# Patient Record
Sex: FEMALE | Race: WHITE | NOT HISPANIC OR LATINO | Employment: FULL TIME | URBAN - NONMETROPOLITAN AREA
[De-identification: names, ages, dates, MRNs, and addresses within clinical notes are randomized per-mention and may not be internally consistent; named-entity substitution may affect disease eponyms.]

---

## 2017-03-08 ENCOUNTER — TRANSCRIBE ORDERS (OUTPATIENT)
Dept: ADMINISTRATIVE | Facility: HOSPITAL | Age: 47
End: 2017-03-08

## 2017-03-08 DIAGNOSIS — I34.1 MITRAL VALVE PROLAPSE: Primary | ICD-10-CM

## 2017-03-15 ENCOUNTER — HOSPITAL ENCOUNTER (OUTPATIENT)
Dept: CARDIOLOGY | Facility: HOSPITAL | Age: 47
Discharge: HOME | End: 2017-03-15
Admitting: FAMILY MEDICINE

## 2017-03-15 ENCOUNTER — OUTSIDE FACILITY SERVICE (OUTPATIENT)
Dept: CARDIOLOGY | Facility: CLINIC | Age: 47
End: 2017-03-15

## 2017-03-15 DIAGNOSIS — I34.1 MITRAL VALVE PROLAPSE: ICD-10-CM

## 2017-03-15 PROCEDURE — 93306 TTE W/DOPPLER COMPLETE: CPT

## 2017-03-15 PROCEDURE — 93306 TTE W/DOPPLER COMPLETE: CPT | Performed by: INTERNAL MEDICINE

## 2017-04-10 ENCOUNTER — APPOINTMENT (OUTPATIENT)
Dept: WOMENS IMAGING | Facility: HOSPITAL | Age: 47
End: 2017-04-10

## 2017-04-10 PROCEDURE — 77063 BREAST TOMOSYNTHESIS BI: CPT | Performed by: RADIOLOGY

## 2017-04-10 PROCEDURE — 77067 SCR MAMMO BI INCL CAD: CPT | Performed by: RADIOLOGY

## 2018-05-30 ENCOUNTER — APPOINTMENT (OUTPATIENT)
Dept: WOMENS IMAGING | Facility: HOSPITAL | Age: 48
End: 2018-05-30

## 2018-05-30 PROCEDURE — 77063 BREAST TOMOSYNTHESIS BI: CPT | Performed by: RADIOLOGY

## 2018-05-30 PROCEDURE — 77067 SCR MAMMO BI INCL CAD: CPT | Performed by: RADIOLOGY

## 2018-06-07 ENCOUNTER — APPOINTMENT (OUTPATIENT)
Dept: WOMENS IMAGING | Facility: HOSPITAL | Age: 48
End: 2018-06-07

## 2018-06-07 PROCEDURE — 77063 BREAST TOMOSYNTHESIS BI: CPT | Performed by: RADIOLOGY

## 2018-06-07 PROCEDURE — 77065 DX MAMMO INCL CAD UNI: CPT | Performed by: RADIOLOGY

## 2018-11-01 ENCOUNTER — OFFICE VISIT (OUTPATIENT)
Dept: CARDIOLOGY | Facility: CLINIC | Age: 48
End: 2018-11-01

## 2018-11-01 VITALS
WEIGHT: 236 LBS | HEIGHT: 67 IN | DIASTOLIC BLOOD PRESSURE: 88 MMHG | BODY MASS INDEX: 37.04 KG/M2 | HEART RATE: 72 BPM | SYSTOLIC BLOOD PRESSURE: 130 MMHG

## 2018-11-01 DIAGNOSIS — E88.81 METABOLIC SYNDROME: ICD-10-CM

## 2018-11-01 DIAGNOSIS — R00.2 PALPITATIONS: Primary | ICD-10-CM

## 2018-11-01 DIAGNOSIS — R01.1 MURMUR, CARDIAC: ICD-10-CM

## 2018-11-01 DIAGNOSIS — R06.02 SHORTNESS OF BREATH: ICD-10-CM

## 2018-11-01 DIAGNOSIS — I34.0 MITRAL VALVE INSUFFICIENCY, UNSPECIFIED ETIOLOGY: ICD-10-CM

## 2018-11-01 DIAGNOSIS — R07.2 PRECORDIAL PAIN: ICD-10-CM

## 2018-11-01 PROBLEM — E88.810 METABOLIC SYNDROME: Status: ACTIVE | Noted: 2018-11-01

## 2018-11-01 PROCEDURE — 93000 ELECTROCARDIOGRAM COMPLETE: CPT | Performed by: INTERNAL MEDICINE

## 2018-11-01 PROCEDURE — 99244 OFF/OP CNSLTJ NEW/EST MOD 40: CPT | Performed by: INTERNAL MEDICINE

## 2018-11-01 RX ORDER — DICLOFENAC SODIUM 75 MG/1
75 TABLET, DELAYED RELEASE ORAL 2 TIMES DAILY
COMMUNITY

## 2018-11-01 RX ORDER — TRAZODONE HYDROCHLORIDE 50 MG/1
50 TABLET ORAL NIGHTLY
COMMUNITY

## 2018-11-01 NOTE — PROGRESS NOTES
"Chief Complaint   Patient presents with   • Establish Care     Patient wishes to establish care. She has had echo 3/2017, echo in chart. She states \"I have been told I have mitral valve prolapse and I would like to discuss echo results\".    • Aspirin     Patient does not take.   • Shortness of Breath     She had during episode of palpitations yesterday, states \"had soreness in chest during episode of palpitations\".   • Palpitations     Had episode yesterday that last 45 minutes, she became weak the rest of day. She had headache. She reports having random episodes.        CARDIAC COMPLAINTS  chest pressure/discomfort, dyspnea and palpitations      Subjective   Tasha Muñoz is a 48 y.o. female came in today for her initial cardiac evaluation.  She has no previously diagnosed cardiac problems in the form of hypertension or diabetes.  She was told that she did have mitral valve prolapse many years ago.  About a year ago, she underwent an echocardiogram which showed normal LV function and mild to moderate mitral regurgitation.  She was not having any symptoms about a year ago.  Recently, she started noticing palpitation in the form of heart suddenly raises and stays like that for about 30-45 minutes.  She had an episode yesterday which apparently lasted for about 45 minutes.  By the time she came to your office, the symptoms subsided.  She apparently had an EKG which according to her was normal.  During the time when she had the palpitation she had shortness of breath and soreness in the chest during the palpitation.  She normally gets his palpitation about 2 or 3 times a month and normally lasts only for few minutes.  She did have some lab work done at the beginning of the year which showed normal thyroid function and normal electrolytes.  Her cholesterol was not checked at that time.  She is not a smoker and there is no significant family history of sudden death or ischemic heart disease.  She does work in the " kitchen at the school.      Past Surgical History:   Procedure Laterality Date   • ECHO - CONVERTED  03/15/2017    EF 65%. Mild- Mod MR. RVSP- 35 mmHg.       Current Outpatient Prescriptions   Medication Sig Dispense Refill   • diclofenac (VOLTAREN) 75 MG EC tablet Take 75 mg by mouth 2 (Two) Times a Day.     • sertraline (ZOLOFT) 50 MG tablet Take 50 mg by mouth Daily.     • traZODone (DESYREL) 50 MG tablet Take 50 mg by mouth Every Night.       No current facility-administered medications for this visit.            ALLERGIES:  Patient has no known allergies.    Past Medical History:   Diagnosis Date   • Arthritis    • Hx of  section    • Hx of foot surgery 2018   • Hx of total knee replacement, right    • Mitral valve prolapse        History   Smoking Status   • Never Smoker   Smokeless Tobacco   • Never Used          Family History   Problem Relation Age of Onset   • Lung cancer Mother    • No Known Problems Father    • No Known Problems Brother    • No Known Problems Son    • No Known Problems Daughter        Review of Systems   Constitution: Positive for weakness and malaise/fatigue. Negative for decreased appetite.   HENT: Negative for congestion and sore throat.    Eyes: Negative for blurred vision.   Cardiovascular: Positive for chest pain, dyspnea on exertion and palpitations.   Respiratory: Positive for shortness of breath. Negative for snoring.    Endocrine: Negative for cold intolerance and heat intolerance.   Hematologic/Lymphatic: Negative for adenopathy. Does not bruise/bleed easily.   Skin: Negative for itching, nail changes and skin cancer.   Musculoskeletal: Negative for arthritis and myalgias.   Gastrointestinal: Negative for abdominal pain, dysphagia and heartburn.   Genitourinary: Negative for bladder incontinence and frequency.   Neurological: Negative for dizziness, light-headedness, seizures and vertigo.   Psychiatric/Behavioral: Negative for altered mental status. The patient  "is nervous/anxious.    Allergic/Immunologic: Negative for environmental allergies and hives.       Diabetes- No  Thyroid- normal    Objective     /88 (BP Location: Right arm)   Pulse 72   Ht 170.2 cm (67\")   Wt 107 kg (236 lb)   BMI 36.96 kg/m²     Physical Exam   Constitutional: She is oriented to person, place, and time. She appears well-developed and well-nourished.   HENT:   Head: Normocephalic.   Nose: Nose normal.   Eyes: Pupils are equal, round, and reactive to light. EOM are normal.   Neck: Normal range of motion. Neck supple.   Cardiovascular: Normal rate, regular rhythm, S1 normal and S2 normal.    Murmur heard.  Pulmonary/Chest: Breath sounds normal.   Abdominal: Soft. Bowel sounds are normal.   Musculoskeletal: Normal range of motion. She exhibits no edema.   Neurological: She is alert and oriented to person, place, and time.   Skin: Skin is warm and dry.   Psychiatric: She has a normal mood and affect.         ECG 12 Lead  Date/Time: 11/1/2018 1:53 PM  Performed by: ONELIA BROCK  Authorized by: ONELIA BROCK   Previous ECG: no previous ECG available  Rhythm: sinus rhythm  Rate: normal  Conduction: non-specific intraventricular conduction delay  Clinical impression: non-specific ECG              Assessment/Plan   Patient's Body mass index is 36.96 kg/m². BMI is above normal parameters. Recommendations include: educational material, exercise counseling and nutrition counseling.     Tasha was seen today for establish care, aspirin, shortness of breath and palpitations.    Diagnoses and all orders for this visit:    Palpitations  -     Mobile Cardiac Outpatient Telemetry; Future  -     Treadmill Stress Test; Future    Shortness of breath  -     Adult Transthoracic Echo Complete W/ Cont if Necessary Per Protocol; Future    Murmur, cardiac  -     Adult Transthoracic Echo Complete W/ Cont if Necessary Per Protocol; Future    Mitral valve insufficiency, unspecified " etiology    Metabolic syndrome    Precordial pain  -     Treadmill Stress Test; Future       At baseline, her heart rate is stable but the blood pressure is upper limit of normal.  Her BMI is 37.  Her EKG showed sinus rhythm with intraventricular conduction delay.  Her clinical examination is unremarkable other than short systolic murmur at the mitral area.  Her rest of the examination was normal.  I explained to her about the echo findings.  I explained to her about the mitral regurgitation. I talked to her about cutting down on the sodium intake.  I also talked to her about cutting down on the carbohydrate intake.  I gave her papers on Mediterranean diet.  I would place an extended monitor to see what can of arrhythmia she has.  I'm going to repeat the echocardiogram to reevaluate the mitral valve and the PA pressure.  Also scheduled her to undergo a regular stress test to evaluate for any stress-induced arrhythmia and ischemia since she also has some chest tightness during the palpitation.  Based on the results of these tests, further recommendations will be made.             Electronically signed by Carmela Melara MD November 1, 2018 1:40 PM

## 2018-11-01 NOTE — PATIENT INSTRUCTIONS
Mediterranean Diet  A Mediterranean diet refers to food and lifestyle choices that are based on the traditions of countries located on the [place]. This way of eating has been shown to help prevent certain conditions and improve outcomes for people who have chronic diseases, like kidney disease and heart disease.  What are tips for following this plan?  Lifestyle  · Cook and eat meals together with your family, when possible.  · Drink enough fluid to keep your urine clear or pale yellow.  · Be physically active every day. This includes:  ? Aerobic exercise like running or swimming.  ? Leisure activities like gardening, walking, or housework.  · Get 7-8 hours of sleep each night.  · If recommended by your health care provider, drink red wine in moderation. This means 1 glass a day for nonpregnant women and 2 glasses a day for men. A glass of wine equals 5 oz (150 mL).  Reading food labels  · Check the serving size of packaged foods. For foods such as rice and pasta, the serving size refers to the amount of cooked product, not dry.  · Check the total fat in packaged foods. Avoid foods that have saturated fat or trans fats.  · Check the ingredients list for added sugars, such as corn syrup.  Shopping  · At the grocery store, buy most of your food from the areas near the walls of the store. This includes:  ? Fresh fruits and vegetables (produce).  ? Grains, beans, nuts, and seeds. Some of these may be available in unpackaged forms or large amounts (in bulk).  ? Fresh seafood.  ? Poultry and eggs.  ? Low-fat dairy products.  · Buy whole ingredients instead of prepackaged foods.  · Buy fresh fruits and vegetables in-season from local farmers markets.  · Buy frozen fruits and vegetables in resealable bags.  · If you do not have access to quality fresh seafood, buy precooked frozen shrimp or canned fish, such as tuna, salmon, or sardines.  · Buy small amounts of raw or cooked vegetables, salads, or olives from the  deli or salad bar at your store.  · Stock your pantry so you always have certain foods on hand, such as olive oil, canned tuna, canned tomatoes, rice, pasta, and beans.  Cooking  · Cook foods with extra-virgin olive oil instead of using butter or other vegetable oils.  · Have meat as a side dish, and have vegetables or grains as your main dish. This means having meat in small portions or adding small amounts of meat to foods like pasta or stew.  · Use beans or vegetables instead of meat in common dishes like chili or lasagna.  · [place] with different cooking methods. Try roasting or broiling vegetables instead of steaming or sautéeing them.  · Add frozen vegetables to soups, stews, pasta, or rice.  · Add nuts or seeds for added healthy fat at each meal. You can add these to yogurt, salads, or vegetable dishes.  · Marinate fish or vegetables using olive oil, lemon juice, garlic, and fresh herbs.  Meal planning  · Plan to eat 1 vegetarian meal one day each week. Try to work up to 2 vegetarian meals, if possible.  · Eat seafood 2 or more times a week.  · Have healthy snacks readily available, such as:  ? Vegetable sticks with hummus.  ? Greek yogurt.  ? Fruit and nut trail mix.  · Eat balanced meals throughout the week. This includes:  ? Fruit: 2-3 servings a day  ? Vegetables: 4-5 servings a day  ? Low-fat dairy: 2 servings a day  ? Fish, poultry, or lean meat: 1 serving a day  ? Beans and legumes: 2 or more servings a week  ? Nuts and seeds: 1-2 servings a day  ? Whole grains: 6-8 servings a day  ? Extra-virgin olive oil: 3-4 servings a day  · Limit red meat and sweets to only a few servings a month  What are my food choices?  · Mediterranean diet  ? Recommended  ? Grains: Whole-grain pasta. Brown rice. Bulgar wheat. Polenta. Couscous. Whole-wheat bread. Oatmeal. Quinoa.  ? Vegetables: Artichokes. Beets. Broccoli. Cabbage. Carrots. Eggplant. Green beans. Chard. Kale. Spinach. Onions. Leeks. Peas. Squash.  Tomatoes. Peppers. Radishes.  ? Fruits: Apples. Apricots. Avocado. Berries. Bananas. Cherries. Dates. Figs. Grapes. Rober. Melon. Oranges. Peaches. Plums. Pomegranate.  ? Meats and other protein foods: Beans. Almonds. Sunflower seeds. Pine nuts. Peanuts. Cod. [place]. Scallops. Shrimp. Tuna. Tilapia. Clams. Oysters. Eggs.  ? Dairy: Low-fat milk. Cheese. Greek yogurt.  ? Beverages: Water. Red wine. Herbal tea.  ? Fats and oils: Extra virgin olive oil. Avocado oil. Grape seed oil.  ? Sweets and desserts: Greek yogurt with honey. Baked apples. Poached pears. Trail mix.  ? Seasoning and other foods: Basil. Cilantro. Coriander. Cumin. Mint. Parsley. Anand. Rosemary. Tarragon. Garlic. Oregano. Thyme. Pepper. Balsalmic vinegar. Tahini. Hummus. Tomato sauce. Olives. Mushrooms.  ? Limit these  ? Grains: Prepackaged pasta or rice dishes. Prepackaged cereal with added sugar.  ? Vegetables: Deep fried potatoes (french fries).  ? Fruits: Fruit canned in syrup.  ? Meats and other protein foods: Beef. Pork. Lamb. Poultry with skin. Hot dogs. [place].  ? Dairy: Ice cream. Sour cream. Whole milk.  ? Beverages: Juice. Sugar-sweetened soft drinks. Beer. Liquor and spirits.  ? Fats and oils: Butter. Canola oil. Vegetable oil. Beef fat (tallow). Lard.  ? Sweets and desserts: Cookies. Cakes. Pies. Candy.  ? Seasoning and other foods: Mayonnaise. Premade sauces and marinades.  ? The items listed may not be a complete list. Talk with your dietitian about what dietary choices are right for you.  Summary  · The Mediterranean diet includes both food and lifestyle choices.  · Eat a variety of fresh fruits and vegetables, beans, nuts, seeds, and whole grains.  · Limit the amount of red meat and sweets that you eat.  · Talk with your health care provider about whether it is safe for you to drink red wine in moderation. This means 1 glass a day for nonpregnant women and 2 glasses a day for men. A glass of wine equals 5 oz (150 mL).  This information  is not intended to replace advice given to you by your health care provider. Make sure you discuss any questions you have with your health care provider.  Document Released: 08/10/2017 Document Revised: 09/12/2017 Document Reviewed: 08/10/2017  [employer] Interactive Patient Education © 2018 [employer]

## 2018-11-05 ENCOUNTER — TELEPHONE (OUTPATIENT)
Dept: CARDIOLOGY | Facility: CLINIC | Age: 48
End: 2018-11-05

## 2018-11-05 NOTE — TELEPHONE ENCOUNTER
Patient does not meet criteria with her insurance for an MCOT.  Do you want to order a Cardiokey?    If so how many days?

## 2018-11-06 ENCOUNTER — HOSPITAL ENCOUNTER (OUTPATIENT)
Dept: CARDIOLOGY | Facility: HOSPITAL | Age: 48
Discharge: HOME | End: 2018-11-06
Attending: INTERNAL MEDICINE | Admitting: INTERNAL MEDICINE

## 2018-11-06 ENCOUNTER — HOSPITAL ENCOUNTER (OUTPATIENT)
Dept: CARDIOLOGY | Facility: HOSPITAL | Age: 48
Discharge: HOME | End: 2018-11-06
Attending: INTERNAL MEDICINE

## 2018-11-06 VITALS — BODY MASS INDEX: 37.02 KG/M2 | HEIGHT: 67 IN | WEIGHT: 235.89 LBS

## 2018-11-06 DIAGNOSIS — R01.1 MURMUR, CARDIAC: ICD-10-CM

## 2018-11-06 DIAGNOSIS — R00.2 PALPITATIONS: ICD-10-CM

## 2018-11-06 DIAGNOSIS — R00.2 PALPITATION: Primary | ICD-10-CM

## 2018-11-06 DIAGNOSIS — R07.2 PRECORDIAL PAIN: ICD-10-CM

## 2018-11-06 DIAGNOSIS — R06.02 SHORTNESS OF BREATH: ICD-10-CM

## 2018-11-06 PROCEDURE — 93306 TTE W/DOPPLER COMPLETE: CPT | Performed by: INTERNAL MEDICINE

## 2018-11-06 PROCEDURE — 93306 TTE W/DOPPLER COMPLETE: CPT

## 2018-11-06 PROCEDURE — 93018 CV STRESS TEST I&R ONLY: CPT | Performed by: INTERNAL MEDICINE

## 2018-11-06 PROCEDURE — 93017 CV STRESS TEST TRACING ONLY: CPT

## 2018-11-07 ENCOUNTER — TELEPHONE (OUTPATIENT)
Dept: CARDIOLOGY | Facility: CLINIC | Age: 48
End: 2018-11-07

## 2018-11-07 ENCOUNTER — CLINICAL SUPPORT (OUTPATIENT)
Dept: CARDIOLOGY | Facility: CLINIC | Age: 48
End: 2018-11-07

## 2018-11-07 DIAGNOSIS — R00.2 PALPITATIONS: ICD-10-CM

## 2018-11-07 LAB
BH CV ECHO MEAS - AO MAX PG: 9 MMHG
BH CV ECHO MEAS - AO MEAN PG: 4.2 MMHG
BH CV ECHO MEAS - AO ROOT AREA (BSA CORRECTED): 1.2
BH CV ECHO MEAS - AO ROOT AREA: 5.4 CM^2
BH CV ECHO MEAS - AO ROOT DIAM: 2.6 CM
BH CV ECHO MEAS - AO V2 MAX: 150.4 CM/SEC
BH CV ECHO MEAS - AO V2 MEAN: 91.5 CM/SEC
BH CV ECHO MEAS - AO V2 VTI: 30.4 CM
BH CV ECHO MEAS - BSA(HAYCOCK): 2.3 M^2
BH CV ECHO MEAS - BSA: 2.2 M^2
BH CV ECHO MEAS - BZI_BMI: 37 KILOGRAMS/M^2
BH CV ECHO MEAS - BZI_METRIC_HEIGHT: 170.2 CM
BH CV ECHO MEAS - BZI_METRIC_WEIGHT: 107.1 KG
BH CV ECHO MEAS - EDV(CUBED): 74.5 ML
BH CV ECHO MEAS - EDV(TEICH): 78.9 ML
BH CV ECHO MEAS - EF(CUBED): 75.1 %
BH CV ECHO MEAS - EF(TEICH): 67.4 %
BH CV ECHO MEAS - ESV(CUBED): 18.6 ML
BH CV ECHO MEAS - ESV(TEICH): 25.8 ML
BH CV ECHO MEAS - FS: 37.1 %
BH CV ECHO MEAS - IVS/LVPW: 1.2
BH CV ECHO MEAS - IVSD: 1.1 CM
BH CV ECHO MEAS - LA DIMENSION: 3.2 CM
BH CV ECHO MEAS - LA/AO: 1.2
BH CV ECHO MEAS - LV IVRT: 0.07 SEC
BH CV ECHO MEAS - LV MASS(C)D: 141.2 GRAMS
BH CV ECHO MEAS - LV MASS(C)DI: 65.1 GRAMS/M^2
BH CV ECHO MEAS - LVIDD: 4.2 CM
BH CV ECHO MEAS - LVIDS: 2.6 CM
BH CV ECHO MEAS - LVPWD: 0.92 CM
BH CV ECHO MEAS - MITRAL HR: 143.3 BPM
BH CV ECHO MEAS - MITRAL R-R: 0.42 SEC
BH CV ECHO MEAS - MR MAX PG: 18.2 MMHG
BH CV ECHO MEAS - MR MAX VEL: 213.5 CM/SEC
BH CV ECHO MEAS - MV A MAX VEL: 79 CM/SEC
BH CV ECHO MEAS - MV DEC SLOPE: 313 CM/SEC^2
BH CV ECHO MEAS - MV DEC TIME: 0.26 SEC
BH CV ECHO MEAS - MV E MAX VEL: 80.3 CM/SEC
BH CV ECHO MEAS - MV E/A: 1
BH CV ECHO MEAS - MV MAX PG: 4.6 MMHG
BH CV ECHO MEAS - MV MEAN PG: 2.5 MMHG
BH CV ECHO MEAS - MV V2 MAX: 107.4 CM/SEC
BH CV ECHO MEAS - MV V2 MEAN: 75.1 CM/SEC
BH CV ECHO MEAS - MV V2 VTI: 31.4 CM
BH CV ECHO MEAS - PA MAX PG: 4.7 MMHG
BH CV ECHO MEAS - PA MEAN PG: 2.5 MMHG
BH CV ECHO MEAS - PA V2 MAX: 108.5 CM/SEC
BH CV ECHO MEAS - PA V2 MEAN: 73.2 CM/SEC
BH CV ECHO MEAS - PA V2 VTI: 23.3 CM
BH CV ECHO MEAS - PULM. HR: 188.5 BPM
BH CV ECHO MEAS - PULM. R-R: 0.32 SEC
BH CV ECHO MEAS - RAP SYSTOLE: 10 MMHG
BH CV ECHO MEAS - RVDD: 3.2 CM
BH CV ECHO MEAS - RVSP: 21.5 MMHG
BH CV ECHO MEAS - SI(AO): 75.9 ML/M^2
BH CV ECHO MEAS - SI(CUBED): 25.8 ML/M^2
BH CV ECHO MEAS - SI(TEICH): 24.5 ML/M^2
BH CV ECHO MEAS - SV(AO): 164.7 ML
BH CV ECHO MEAS - SV(CUBED): 55.9 ML
BH CV ECHO MEAS - SV(TEICH): 53.1 ML
BH CV ECHO MEAS - TR MAX VEL: 169.2 CM/SEC
BH CV STRESS RECOVERY BP: NORMAL MMHG
BH CV STRESS RECOVERY HR: 91 BPM
MAXIMAL PREDICTED HEART RATE: 172 BPM
MAXIMAL PREDICTED HEART RATE: 172 BPM
PERCENT MAX PREDICTED HR: 86.05 %
STRESS BASELINE BP: NORMAL MMHG
STRESS BASELINE HR: 78 BPM
STRESS PERCENT HR: 101 %
STRESS POST ESTIMATED WORKLOAD: 7 METS
STRESS POST EXERCISE DUR MIN: 5 MIN
STRESS POST EXERCISE DUR SEC: 1 SEC
STRESS POST PEAK BP: NORMAL MMHG
STRESS POST PEAK HR: 148 BPM
STRESS TARGET HR: 146 BPM
STRESS TARGET HR: 146 BPM

## 2018-11-07 PROCEDURE — 0296T PR EXT ECG > 48HR TO 21 DAY RCRD W/CONECT INTL RCRD: CPT | Performed by: INTERNAL MEDICINE

## 2018-11-07 NOTE — TELEPHONE ENCOUNTER
See result note on stress test.    30 day lopressor script sent to [facility] and 90 day lopressor script sent to [facility].

## 2018-11-26 ENCOUNTER — OUTSIDE FACILITY SERVICE (OUTPATIENT)
Dept: CARDIOLOGY | Facility: CLINIC | Age: 48
End: 2018-11-26

## 2018-11-26 PROCEDURE — 0298T PR EXT ECG > 48HR TO 21 DAY REVIEW AND INTERPRETATN: CPT | Performed by: INTERNAL MEDICINE

## 2019-02-07 ENCOUNTER — OFFICE VISIT (OUTPATIENT)
Dept: CARDIOLOGY | Facility: CLINIC | Age: 49
End: 2019-02-07

## 2019-02-07 VITALS
BODY MASS INDEX: 35.79 KG/M2 | HEART RATE: 56 BPM | WEIGHT: 228 LBS | HEIGHT: 67 IN | DIASTOLIC BLOOD PRESSURE: 62 MMHG | SYSTOLIC BLOOD PRESSURE: 90 MMHG

## 2019-02-07 DIAGNOSIS — E66.9 OBESITY (BMI 35.0-39.9 WITHOUT COMORBIDITY): ICD-10-CM

## 2019-02-07 DIAGNOSIS — I31.39 PERICARDIAL EFFUSION: ICD-10-CM

## 2019-02-07 DIAGNOSIS — Z79.899 MEDICATION MANAGEMENT: ICD-10-CM

## 2019-02-07 DIAGNOSIS — E88.81 METABOLIC SYNDROME: ICD-10-CM

## 2019-02-07 DIAGNOSIS — R01.1 MURMUR, CARDIAC: ICD-10-CM

## 2019-02-07 DIAGNOSIS — I34.0 MITRAL VALVE INSUFFICIENCY, UNSPECIFIED ETIOLOGY: Primary | ICD-10-CM

## 2019-02-07 DIAGNOSIS — R00.2 PALPITATIONS: ICD-10-CM

## 2019-02-07 PROCEDURE — 99213 OFFICE O/P EST LOW 20 MIN: CPT | Performed by: NURSE PRACTITIONER

## 2019-02-07 NOTE — PROGRESS NOTES
Chief Complaint   Patient presents with   • Follow-up     cardiac management.  metoprolol added at stress test.  Also wore 14 day monitor, no arrhythmia, metoprolol continued.  Patient had lap band on 12/28/18 with Dr. Ornelas, pt has lost 17 lbs since surgery.  Palpitations are much better.  BP today 90/62.  Pt does not feel dizziness.  She feels tired but not anything new.  She has noticed arthritis symptoms increased after starting metoprolol.  She said it is not bad though.   • Labs     2/2018 labs from PCP in chart.  Patient also had some labs with Dr. Ornelas in Dec 2018       Subjective       Tasha Muñoz is a 48 y.o. female  seen in November 2018 for initial cardiac evaluation.  She has no previously diagnosed cardiac problems except told that she did have mitral valve prolapse many years ago. In 2017, she underwent an echocardiogram which showed normal LV function and mild to moderate mitral regurgitation. She was referred due to palpitation in. Lab work showed normal thyroid function and normal electrolytes. ON 11/1/18, a regular stress test and echo were done. No ischemia or arrhythmia noted by EKG criteria. Heart rate response was normal but blood pressure increased. Low dose beta blocker in form of Lopressor recommended. IN December 2018, she underwent placement of Lap band by Dr. Ornelas.     Today she comes to the office for a follow up visit. She has lost about 18 pounds since December. Her blood pressure and heart rate are well controlled. Palpitations have significant improved. No increase in fatigue or shortness of breath noted.     HPI     Cardiac History:    Past Surgical History:   Procedure Laterality Date   • CARDIOVASCULAR STRESS TEST  11/06/2018    5 Min. 7.0 METS. 86% THR. BP- 179/76. Rare PVC. Negative.   • CONVERTED (HISTORICAL) HOLTER  11/26/2018    AVG HR 71 BPM.    • ECHO - CONVERTED  03/15/2017    EF 65%. Mild- Mod MR. RVSP- 35 mmHg.   • ECHO - CONVERTED  11/06/2018    EF  65%. Mild MR. RVSP- 22 mmHg. Small pericardial Effusion       Current Outpatient Medications   Medication Sig Dispense Refill   • diclofenac (VOLTAREN) 75 MG EC tablet Take 75 mg by mouth 2 (Two) Times a Day.     • metoprolol tartrate (LOPRESSOR) 25 MG tablet Decrease to 1/2 tablet twice a day 180 tablet 3   • sertraline (ZOLOFT) 50 MG tablet Take 50 mg by mouth Daily.     • traZODone (DESYREL) 50 MG tablet Take 50 mg by mouth Every Night.       No current facility-administered medications for this visit.        Patient has no known allergies.    Past Medical History:   Diagnosis Date   • Arthritis    • Hx of  section    • Hx of foot surgery 2018   • Hx of laparoscopic gastric banding 2018    Dr. Ornelas   • Hx of total knee replacement, right    • Mitral valve prolapse        Social History     Socioeconomic History   • Marital status:      Spouse name: Not on file   • Number of children: Not on file   • Years of education: Not on file   • Highest education level: Not on file   Social Needs   • Financial resource strain: Not on file   • Food insecurity - worry: Not on file   • Food insecurity - inability: Not on file   • Transportation needs - medical: Not on file   • Transportation needs - non-medical: Not on file   Occupational History   • Not on file   Tobacco Use   • Smoking status: Never Smoker   • Smokeless tobacco: Never Used   Substance and Sexual Activity   • Alcohol use: No   • Drug use: No   • Sexual activity: Not on file   Other Topics Concern   • Not on file   Social History Narrative   • Not on file       Family History   Problem Relation Age of Onset   • Lung cancer Mother    • No Known Problems Father    • No Known Problems Brother    • No Known Problems Son    • No Known Problems Daughter        Review of Systems   Constitution: Positive for malaise/fatigue (mild, no worse) and weight loss. Negative for weakness.   HENT: Negative for congestion, hoarse voice and  "odynophagia.    Eyes: Negative for visual disturbance.   Cardiovascular: Positive for palpitations (rare, much improved). Negative for chest pain, dyspnea on exertion, irregular heartbeat, leg swelling and near-syncope.   Respiratory: Negative for cough and shortness of breath.    Endocrine: Negative for polydipsia, polyphagia and polyuria.   Hematologic/Lymphatic: Negative for bleeding problem. Does not bruise/bleed easily.   Skin: Negative for color change, dry skin and itching.   Musculoskeletal: Positive for arthritis and joint pain. Negative for muscle cramps and muscle weakness.   Gastrointestinal: Negative for bloating, abdominal pain, heartburn and melena.   Genitourinary: Negative for dysuria and hematuria.   Neurological: Negative for dizziness, headaches and light-headedness.   Psychiatric/Behavioral: The patient does not have insomnia and is not nervous/anxious.         Objective     BP 90/62 (BP Location: Left arm)   Pulse 56   Ht 170.2 cm (67\")   Wt 103 kg (228 lb)   BMI 35.71 kg/m²     Physical Exam   Constitutional: She is oriented to person, place, and time. She appears well-nourished.   HENT:   Head: Normocephalic.   Eyes: Conjunctivae are normal. Pupils are equal, round, and reactive to light.   Neck: Normal range of motion. Neck supple. Carotid bruit is not present.   Cardiovascular: Normal rate, regular rhythm, S1 normal, S2 normal and normal pulses.   No murmur heard.  Pulmonary/Chest: Effort normal and breath sounds normal. She has no wheezes. She has no rales.   Abdominal: Soft. Bowel sounds are normal.   Musculoskeletal: Normal range of motion. She exhibits no edema.   Neurological: She is alert and oriented to person, place, and time.   Skin: Skin is warm and dry. No pallor.   Psychiatric: She has a normal mood and affect.        Procedures: none today        Assessment/Plan      Tasha was seen today for follow-up and labs.    Diagnoses and all orders for this visit:    Mitral valve " insufficiency, unspecified etiology    Obesity (BMI 35.0-39.9 without comorbidity)    Murmur, cardiac    Palpitations    Metabolic syndrome    Pericardial effusion    Medication management    Other orders  -     metoprolol tartrate (LOPRESSOR) 25 MG tablet; Decrease to 1/2 tablet twice a day      The reports of her recent stress and echo reviewed. No ischemia was noted by EKG criteria. LV function normal and MR noted to be mild. Her blood pressure today is slightly low and heart rate well controlled. Advised to decrease Metoprolol to 1/2 tablet bid. Monitor vital signs. We discussed, as she loses weight, the need for medication will diminish and beta blocker maybe stopped.   Small pericardial effusion noted. No cardiac rub noted today. Repeat echo not advised at his time, unless she develops more symptoms.    Patient's Body mass index is 35.71 kg/m². BMI is above normal parameters. Recommendations include: nutrition counseling. Diet per Dr. Ornelas. I complimented her weight loss.      Low impact cardio exercise, such as 150 minutes of walking weekly based on guidelines, was encouraged.     From a cardiac standpoint, Tasha appears stable. We will see her back in 6 months. Please call sooner for any cardiac concerns.            Electronically signed by CAITLYN Chamberlain,  February 8, 2019 8:29 AM

## 2019-02-07 NOTE — PATIENT INSTRUCTIONS
Palpitations  A palpitation is the feeling that your heartbeat is irregular or is faster than normal. It may feel like your heart is fluttering or skipping a beat. Palpitations are usually not a serious problem. They may be caused by many things, including smoking, caffeine, alcohol, stress, and certain medicines. Although most causes of palpitations are not serious, palpitations can be a sign of a serious medical problem. In some cases, you may need further medical evaluation.  Follow these instructions at home:  Pay attention to any changes in your symptoms. Take these actions to help with your condition:  · Avoid the following:  ? Caffeinated coffee, tea, soft drinks, diet pills, and energy drinks.  ? Chocolate.  ? Alcohol.  · Do not use any tobacco products, such as cigarettes, chewing tobacco, and e-cigarettes. If you need help quitting, ask your health care provider.  · Try to reduce your stress and anxiety. Things that can help you relax include:  ? Yoga.  ? Meditation.  ? Physical activity, such as swimming, jogging, or walking.  ? Biofeedback. This is a method that helps you learn to use your mind to control things in your body, such as your heartbeats.  · Get plenty of rest and sleep.  · Take over-the-counter and prescription medicines only as told by your health care provider.  · Keep all follow-up visits as told by your health care provider. This is important.    Contact a health care provider if:  · You continue to have a fast or irregular heartbeat after 24 hours.  · Your palpitations occur more often.  Get help right away if:  · You have chest pain or shortness of breath.  · You have a severe headache.  · You feel dizzy or you faint.  This information is not intended to replace advice given to you by your health care provider. Make sure you discuss any questions you have with your health care provider.  Document Released: 12/15/2001 Document Revised: 05/22/2017 Document Reviewed: 09/01/2016  [employer]  Interactive Patient Education © 2018 [employer] Inc.

## 2019-06-10 ENCOUNTER — APPOINTMENT (OUTPATIENT)
Dept: WOMENS IMAGING | Facility: HOSPITAL | Age: 49
End: 2019-06-10

## 2019-06-10 PROCEDURE — 77067 SCR MAMMO BI INCL CAD: CPT | Performed by: RADIOLOGY

## 2019-06-10 PROCEDURE — 77063 BREAST TOMOSYNTHESIS BI: CPT | Performed by: RADIOLOGY

## 2020-01-02 ENCOUNTER — TELEPHONE (OUTPATIENT)
Dept: CARDIOLOGY | Facility: CLINIC | Age: 50
End: 2020-01-02

## 2020-01-27 ENCOUNTER — OFFICE VISIT (OUTPATIENT)
Dept: CARDIOLOGY | Facility: CLINIC | Age: 50
End: 2020-01-27

## 2020-01-27 VITALS
WEIGHT: 225 LBS | SYSTOLIC BLOOD PRESSURE: 124 MMHG | HEART RATE: 72 BPM | BODY MASS INDEX: 35.31 KG/M2 | HEIGHT: 67 IN | DIASTOLIC BLOOD PRESSURE: 70 MMHG

## 2020-01-27 DIAGNOSIS — I34.0 MITRAL VALVE INSUFFICIENCY, UNSPECIFIED ETIOLOGY: ICD-10-CM

## 2020-01-27 DIAGNOSIS — R00.2 PALPITATIONS: ICD-10-CM

## 2020-01-27 DIAGNOSIS — Z79.899 MEDICATION MANAGEMENT: ICD-10-CM

## 2020-01-27 DIAGNOSIS — E88.81 METABOLIC SYNDROME: ICD-10-CM

## 2020-01-27 DIAGNOSIS — E66.9 OBESITY (BMI 30-39.9): ICD-10-CM

## 2020-01-27 PROCEDURE — 99213 OFFICE O/P EST LOW 20 MIN: CPT | Performed by: NURSE PRACTITIONER

## 2020-01-27 RX ORDER — AMITRIPTYLINE HYDROCHLORIDE 10 MG/1
10 TABLET, FILM COATED ORAL NIGHTLY
COMMUNITY
End: 2021-01-28 | Stop reason: ALTCHOICE

## 2020-01-27 NOTE — PROGRESS NOTES
Chief Complaint   Patient presents with   • Follow-up     Cardiac management . no current labs . Has no cardiac complaints today. Is  wearing Combi patch hormone patch   • Med Refill     Needs refills on Metoprolol   [facility] supply [facility]         Subjective       Tasha Muñoz is a 49 y.o. female  seen in November 2018 for initial cardiac evaluation.  She has no previously diagnosed cardiac problems except told that she did have mitral valve prolapse many years ago. In 2017, she underwent an echocardiogram which showed normal LV function and mild to moderate mitral regurgitation. She was referred due to palpitation in. Lab work showed normal thyroid function and normal electrolytes. ON 11/1/18, a regular stress test and echo were done. No ischemia or arrhythmia noted by EKG criteria. Heart rate response was normal but blood pressure increased. Low dose beta blocker in form of Lopressor recommended. IN December 2018, she underwent placement of Lap band by Dr. Ornelas.  At her February 2019 office visit her weight was down about 20 pounds, blood pressure was well controlled and palpitations had improved.  The dose of metoprolol was decreased to half tablet twice a day.    Today she comes to the office for a follow-up visit.  Overall she feels she is doing very well.  Most days she takes 1 tablet of metoprolol at night and only half in the mornings.  Sometimes if she is feeling more stressed or had more caffeine she will take a whole tablet in the mornings with benefit.  No chest pain, dizziness, or inappropriate shortness of breath noted.  She is currently being treated for menopausal symptoms with hormone patch and finding some benefit.    HPI     Cardiac History:    Past Surgical History:   Procedure Laterality Date   • CARDIOVASCULAR STRESS TEST  11/06/2018    5 Min. 7.0 METS. 86% THR. BP- 179/76. Rare PVC. Negative.   • CONVERTED (HISTORICAL) HOLTER  11/26/2018    AVG HR 71 BPM.    • ECHO -  CONVERTED  03/15/2017    EF 65%. Mild- Mod MR. RVSP- 35 mmHg.   • ECHO - CONVERTED  2018    EF 65%. Mild MR. RVSP- 22 mmHg. Small pericardial Effusion       Current Outpatient Medications   Medication Sig Dispense Refill   • amitriptyline (ELAVIL) 10 MG tablet Take 10 mg by mouth Every Night.     • diclofenac (VOLTAREN) 75 MG EC tablet Take 75 mg by mouth 2 (Two) Times a Day.     • metoprolol tartrate (LOPRESSOR) 25 MG tablet TAKE 1 TABLET TWICE A DAY 90 tablet 3   • traZODone (DESYREL) 50 MG tablet Take 50 mg by mouth Every Night.       No current facility-administered medications for this visit.        Patient has no known allergies.    Past Medical History:   Diagnosis Date   • Arthritis    • Hx of  section    • Hx of foot surgery 2018   • Hx of laparoscopic gastric banding 2018    Dr. Ornelas   • Hx of total knee replacement, right    • Mitral valve prolapse        Social History     Socioeconomic History   • Marital status:      Spouse name: Not on file   • Number of children: Not on file   • Years of education: Not on file   • Highest education level: Not on file   Tobacco Use   • Smoking status: Never Smoker   • Smokeless tobacco: Never Used   Substance and Sexual Activity   • Alcohol use: No   • Drug use: No       Family History   Problem Relation Age of Onset   • Lung cancer Mother    • No Known Problems Father    • No Known Problems Brother    • No Known Problems Son    • No Known Problems Daughter        Review of Systems   Constitution: Positive for malaise/fatigue (improved with menopause tx) and night sweats. Negative for decreased appetite.   HENT: Negative for congestion, hoarse voice and nosebleeds.    Eyes: Negative.    Cardiovascular: Negative for chest pain, leg swelling, near-syncope and palpitations.   Respiratory: Negative.    Endocrine: Negative.    Hematologic/Lymphatic: Negative.    Skin: Negative.    Musculoskeletal: Negative for muscle cramps and muscle  "weakness.   Gastrointestinal: Negative for bloating, abdominal pain, heartburn and melena.        Follows with Dr. King   Genitourinary: Negative for dysuria and hematuria.   Neurological: Negative for dizziness and headaches.   Psychiatric/Behavioral: The patient is nervous/anxious. The patient does not have insomnia.    Allergic/Immunologic: Negative.         Objective     /70 (BP Location: Left arm)   Pulse 72   Ht 170.2 cm (67\")   Wt 102 kg (225 lb)   BMI 35.24 kg/m²     Physical Exam   Constitutional: She is oriented to person, place, and time. She appears well-nourished.   HENT:   Head: Normocephalic.   Eyes: Pupils are equal, round, and reactive to light. Conjunctivae are normal.   Neck: Normal range of motion. Neck supple. Carotid bruit is not present.   Cardiovascular: Normal rate, regular rhythm, S1 normal and S2 normal.   No murmur heard.  Pulses:       Radial pulses are 2+ on the right side, and 2+ on the left side.   Pulmonary/Chest: Breath sounds normal. She has no wheezes. She has no rales.   Abdominal: Soft. Bowel sounds are normal. There is no tenderness.   Musculoskeletal: Normal range of motion. She exhibits no edema.   Neurological: She is alert and oriented to person, place, and time.   Skin: Skin is warm and dry.   Psychiatric: She has a normal mood and affect. Her behavior is normal.        Procedures: none today         Assessment/Plan      Tasha was seen today for follow-up and med refill.    Diagnoses and all orders for this visit:    Metabolic syndrome    Mitral valve insufficiency, unspecified etiology    Palpitations    Medication management    Obesity (BMI 30-39.9)    Other orders  -     metoprolol tartrate (LOPRESSOR) 25 MG tablet; TAKE 1 TABLET TWICE A DAY    Tasha presents today without cardiac concerns or symptoms voiced.  Her blood pressure, heart rate, and heart rhythm today are normal.  Refills given to continue Lopressor 25 mg twice a day.  If palpitations " worsen or other concerns develop she understands to call.    MR- clinically cardiac murmur sounds stable.  No repeat cardiac testing warranted at this time.    She will follow with you and GYN for lab orders/management.  Please forward copy of most recent lab results.    Patient's Body mass index is 35.24 kg/m². BMI is above normal parameters. Recommendations include: nutrition counseling.  Weight stable since last office visit.  She does follow at weight loss clinic post lap banding.  I also encouraged her on routine exercise.     Tasha Muñoz  reports that she has never smoked. She has never used smokeless tobacco..       A 1 year follow-up visit scheduled.  Please call sooner for cardiac concerns.           Electronically signed by CAITLYN Chamberlain,  January 27, 2020 4:32 PM

## 2020-07-15 ENCOUNTER — APPOINTMENT (OUTPATIENT)
Dept: WOMENS IMAGING | Facility: HOSPITAL | Age: 50
End: 2020-07-15

## 2020-07-15 PROCEDURE — 77067 SCR MAMMO BI INCL CAD: CPT | Performed by: RADIOLOGY

## 2020-07-15 PROCEDURE — 77063 BREAST TOMOSYNTHESIS BI: CPT | Performed by: RADIOLOGY

## 2021-01-28 ENCOUNTER — OFFICE VISIT (OUTPATIENT)
Dept: CARDIOLOGY | Facility: CLINIC | Age: 51
End: 2021-01-28

## 2021-01-28 VITALS
BODY MASS INDEX: 37.98 KG/M2 | TEMPERATURE: 98 F | SYSTOLIC BLOOD PRESSURE: 124 MMHG | HEIGHT: 67 IN | HEART RATE: 72 BPM | DIASTOLIC BLOOD PRESSURE: 86 MMHG | WEIGHT: 242 LBS

## 2021-01-28 DIAGNOSIS — D69.1 THROMBOCYTE DISORDER (HCC): ICD-10-CM

## 2021-01-28 DIAGNOSIS — E88.81 METABOLIC SYNDROME: Primary | ICD-10-CM

## 2021-01-28 DIAGNOSIS — Z79.899 MEDICATION MANAGEMENT: ICD-10-CM

## 2021-01-28 DIAGNOSIS — R00.2 PALPITATIONS: ICD-10-CM

## 2021-01-28 DIAGNOSIS — R73.9 HYPERGLYCEMIA: ICD-10-CM

## 2021-01-28 DIAGNOSIS — E78.5 HYPERLIPIDEMIA LDL GOAL <100: ICD-10-CM

## 2021-01-28 DIAGNOSIS — I34.0 MITRAL VALVE INSUFFICIENCY, UNSPECIFIED ETIOLOGY: ICD-10-CM

## 2021-01-28 DIAGNOSIS — E66.9 OBESITY (BMI 35.0-39.9 WITHOUT COMORBIDITY): ICD-10-CM

## 2021-01-28 DIAGNOSIS — Z01.818 PREOPERATIVE CLEARANCE: ICD-10-CM

## 2021-01-28 PROCEDURE — 99213 OFFICE O/P EST LOW 20 MIN: CPT | Performed by: NURSE PRACTITIONER

## 2021-01-28 RX ORDER — NORTRIPTYLINE HYDROCHLORIDE 25 MG/1
25 CAPSULE ORAL NIGHTLY
COMMUNITY

## 2021-01-28 RX ORDER — VENLAFAXINE 75 MG/1
75 TABLET ORAL DAILY
COMMUNITY

## 2021-01-28 NOTE — PROGRESS NOTES
Chief Complaint   Patient presents with   • Follow-up     for cardiac management   • Med Refill     refills needed on metoprolol, [facility]   • Palpitations     only had a couple episodes with heart racing and fluttering. Pt only taking metoprolol at night.    • Labs     has had several labs by PCP and her rheumatologist.    • Procedure     planning to have total knee replacement March the 3rd with DR Morales. Advised pt to call for clearance.     Subjective       Tasha Muñoz is a 50 y.o. female seen in November 2018 for initial cardiac evaluation of palpitations.  She has no previously diagnosed cardiac problems other than MVP diagnosed many years ago.  In 2017, echocardiogram showed normal LV function and mild to moderate mitral regurgitation.  Labs showed normal thyroid and electrolytes.  Holter showed no significant arrhythmia.  On 11/1/18, a regular stress test and echo were done. No ischemia or arrhythmia noted by EKG criteria. Heart rate response was normal but blood pressure increased. Low dose beta blocker, Lopressor recommended. Holter showed no significant arrhythmia. In December 2018, she underwent placement of Lap band and lost modest amount of weight.      She returns today for yearly follow-up. She has intermittent palpitations where she feels her heart will race and pound but notes significant improvement with metoprolol. She is taking one tablet before bed. Most of the time, palpitations occur early to mid day. She had one episode felt to be triggered by bending over, then her heart raced for 3 hours. No chest pain, dizziness, near syncope. Labs with Dr. Perales showed lipids elevated with , HDL 44. Pravastatin 10 mg added, so far tolerating. She continues to follow with rheumatology. Recent CBC showed H/H 10.9/34.1, platelets 480. CMP normal. She is planning to have R TKR with Dr. Morales on 3/3/21. She requests cardiac clearance letter sent, needs EKG and labs.           Cardiac History:    Past Surgical History:   Procedure Laterality Date   • CARDIOVASCULAR STRESS TEST  2018    5 Min. 7.0 METS. 86% THR. BP- 179/76. Rare PVC. Negative.   • CONVERTED (HISTORICAL) HOLTER  2018    AVG HR 71 BPM.    • ECHO - CONVERTED  03/15/2017    EF 65%. Mild- Mod MR. RVSP- 35 mmHg.   • ECHO - CONVERTED  2018    EF 65%. Mild MR. RVSP- 22 mmHg. Small pericardial Effusion     Current Outpatient Medications   Medication Sig Dispense Refill   • diclofenac (VOLTAREN) 75 MG EC tablet Take 75 mg by mouth 2 (Two) Times a Day.     • metoprolol tartrate (LOPRESSOR) 25 MG tablet Take 1/2 tablet twice daily 90 tablet 4   • nortriptyline (PAMELOR) 25 MG capsule Take 25 mg by mouth Every Night.     • traZODone (DESYREL) 50 MG tablet Take 50 mg by mouth Every Night.     • venlafaxine (EFFEXOR) 75 MG tablet Take 75 mg by mouth Daily.       No current facility-administered medications for this visit.      Patient has no known allergies.    Past Medical History:   Diagnosis Date   • Arthritis    • Hx of  section    • Hx of foot surgery 2018   • Hx of laparoscopic gastric banding 2018    Dr. Ornelas   • Hx of total knee replacement, right    • Mitral valve prolapse      Social History     Socioeconomic History   • Marital status:      Spouse name: Not on file   • Number of children: Not on file   • Years of education: Not on file   • Highest education level: Not on file   Tobacco Use   • Smoking status: Never Smoker   • Smokeless tobacco: Never Used   Substance and Sexual Activity   • Alcohol use: No   • Drug use: No     Family History   Problem Relation Age of Onset   • Lung cancer Mother    • No Known Problems Father    • No Known Problems Brother    • No Known Problems Son    • No Known Problems Daughter      Review of Systems   Constitution: Negative for decreased appetite and malaise/fatigue.   HENT: Negative.    Eyes: Negative for blurred vision.  "  Cardiovascular: Positive for palpitations. Negative for chest pain, dyspnea on exertion, leg swelling and syncope.   Respiratory: Negative for shortness of breath and sleep disturbances due to breathing.    Endocrine: Negative.    Hematologic/Lymphatic: Negative for bleeding problem. Does not bruise/bleed easily.   Skin: Negative.    Musculoskeletal: Negative for falls and myalgias.   Gastrointestinal: Negative for abdominal pain, heartburn and melena.   Genitourinary: Negative for hematuria.   Neurological: Negative for dizziness and light-headedness.   Psychiatric/Behavioral: Negative for altered mental status.   Allergic/Immunologic: Negative.       Objective     /86   Pulse 72   Temp 98 °F (36.7 °C)   Ht 170.2 cm (67\")   Wt 110 kg (242 lb)   BMI 37.90 kg/m²     Vitals signs and nursing note reviewed.   Constitutional:       General: Not in acute distress.     Appearance: Well-developed. Not diaphoretic.   Eyes:      Pupils: Pupils are equal, round, and reactive to light.   HENT:      Head: Normocephalic.   Neck:      Musculoskeletal: Normal range of motion.   Pulmonary:      Effort: Pulmonary effort is normal. No respiratory distress.      Breath sounds: Normal breath sounds.   Cardiovascular:      Normal rate. Regular rhythm.      Murmurs: There is a grade 1/6 systolic murmur at the apex.   Pulses:     Intact distal pulses.   Edema:     Peripheral edema absent.   Abdominal:      General: Bowel sounds are normal.      Palpations: Abdomen is soft.   Musculoskeletal: Normal range of motion.   Skin:     General: Skin is warm and dry.   Neurological:      Mental Status: Alert and oriented to person, place, and time.          ECG 12 Lead    Date/Time: 1/29/2021 9:41 AM  Performed by: Kym Ley APRN  Authorized by: Kym Ley APRN   Comparison: compared with previous ECG from 11/1/2018  Similar to previous ECG  Rhythm: sinus rhythm  Rate: normal  BPM: 75  ST Segments: ST segments normal    Clinical " impression: normal ECG                Problem List Items Addressed This Visit        Other    Palpitations    Relevant Orders    CBC & Differential    TSH    Magnesium    Metabolic syndrome - Primary    Relevant Orders    CBC & Differential    Mitral valve insufficiency    Relevant Medications    metoprolol tartrate (LOPRESSOR) 25 MG tablet    Other Relevant Orders    CBC & Differential      Other Visit Diagnoses     Medication management        Relevant Orders    CBC & Differential    Protime-INR    aPTT    Obesity (BMI 35.0-39.9 without comorbidity)        Relevant Orders    CBC & Differential    Preoperative clearance        Relevant Orders    CBC & Differential    Hyperglycemia        Relevant Orders    CBC & Differential    Hemoglobin A1c    Comprehensive Metabolic Panel    Thrombocyte disorder (CMS/HCC)        Relevant Orders    aPTT    Hyperlipidemia LDL goal <100        Relevant Orders    Lipid Panel         1. Mitral Regurgitation- mild to moderate by echocardiogram in 2017 and 2018. She has slight 1/6 systolic murmur at the mitral area, not clinically significant. Will continue to monitor.     2. Palpitations- sinus tachycardia documented, manage with low dose beta blocker. Recommend dividing Lopressor, take 1/2 in am 1/2 in pm for better 24 hour coverage. Limit caffeine. Adequate water intake. If she develops persistent heart racing as described in HPI, she may need to have an EKG at onset of symptoms or reapply holter. Will check TSH, mag.     3. Hyperlipidemia- pravastatin 10 mg QHS initiated by PCP for /HDL 44. Agree with low dose, moderate intensity statin for prevention of CAD.  Will recheck lipids/LFT to evaluate efficacy.     4. OA- R TKR scheduled for 3/3/21 with Dr. Nava. Will discuss with Dr. Melara and send clearance.     EKG done today for preop showed NSR, no ST-T changes. Labs ordered for preop including CBC, CMP, F. Lipids, A1C, PT/INR, PTT, pre-albumin, TSH and magnesium.  Will forward to Dr. Perales as well as Dr. Morales when available.     She appears stable from a cardiac standpoint. We discussed heart healthy diet/Mediterranean diet/exercise/weight loss strategies.  Follow up in one year or sooner as needed.     Patient's Body mass index is 37.9 kg/m². BMI is above normal parameters. Recommendations include: nutrition counseling.               Electronically signed by CAITLYN Maria,  January 29, 2021 09:46 EST

## 2021-01-29 ENCOUNTER — TELEPHONE (OUTPATIENT)
Dept: CARDIOLOGY | Facility: CLINIC | Age: 51
End: 2021-01-29

## 2021-01-29 DIAGNOSIS — Z01.818 PREOPERATIVE CLEARANCE: Primary | ICD-10-CM

## 2021-01-29 PROCEDURE — 93000 ELECTROCARDIOGRAM COMPLETE: CPT | Performed by: NURSE PRACTITIONER

## 2021-02-09 NOTE — TELEPHONE ENCOUNTER
Annie,     Can we send cardiac clearance letter to Dr. Ashton as well as EKG that was done at recent fu visit? She is having labs at [facility] that we can forward when she has them done. Sx is on 3/3/21.     Thank you

## 2021-08-06 ENCOUNTER — APPOINTMENT (OUTPATIENT)
Dept: WOMENS IMAGING | Facility: HOSPITAL | Age: 51
End: 2021-08-06

## 2021-08-06 PROCEDURE — 77067 SCR MAMMO BI INCL CAD: CPT | Performed by: RADIOLOGY

## 2021-08-06 PROCEDURE — 77063 BREAST TOMOSYNTHESIS BI: CPT | Performed by: RADIOLOGY

## 2022-01-18 ENCOUNTER — OFFICE VISIT (OUTPATIENT)
Dept: CARDIOLOGY | Facility: CLINIC | Age: 52
End: 2022-01-18

## 2022-01-18 VITALS
HEART RATE: 80 BPM | SYSTOLIC BLOOD PRESSURE: 100 MMHG | BODY MASS INDEX: 35.79 KG/M2 | DIASTOLIC BLOOD PRESSURE: 60 MMHG | HEIGHT: 67 IN | WEIGHT: 228 LBS | TEMPERATURE: 98.5 F

## 2022-01-18 DIAGNOSIS — R00.2 PALPITATIONS: ICD-10-CM

## 2022-01-18 DIAGNOSIS — R73.9 HYPERGLYCEMIA: ICD-10-CM

## 2022-01-18 DIAGNOSIS — E88.81 METABOLIC SYNDROME: Primary | ICD-10-CM

## 2022-01-18 DIAGNOSIS — E78.5 HYPERLIPIDEMIA LDL GOAL <100: ICD-10-CM

## 2022-01-18 DIAGNOSIS — E55.9 VITAMIN D DEFICIENCY: ICD-10-CM

## 2022-01-18 PROCEDURE — 99214 OFFICE O/P EST MOD 30 MIN: CPT | Performed by: NURSE PRACTITIONER

## 2022-01-18 RX ORDER — LORATADINE 10 MG/1
10 TABLET ORAL DAILY PRN
COMMUNITY

## 2022-01-18 RX ORDER — PRAVASTATIN SODIUM 10 MG
10 TABLET ORAL NIGHTLY
COMMUNITY
End: 2023-03-09

## 2022-01-18 NOTE — PROGRESS NOTES
Chief Complaint   Patient presents with   • Follow-up     Cardiac management   • Lab     Last labs in March per PCP.   • Rapid Heart Rate     Had episode end of December and first of this month with elevated heart. Heart felt like it was racing and became short of breath. She had been taking prednisone, Augmentin and Bromphen/pseudoephedrine.   • Palpitations     Had increased episodes while having bronchitis, still feels at times like heart is going to flutter. She increased Lopressor to 25mg  bid.   • Med Refill     Needs refills on Metoprolol-90 day.   • Weight loss     Has been doing weight watchers.     Subjective       Tasah Muñoz is a 51 y.o. female seen in November 2018 for initial cardiac evaluation of palpitations.  She has no previously diagnosed cardiac problems other than MVP diagnosed many years ago. In 2017, echo showed normal LVEF, mild to moderate mitral regurgitation. Electrolytes were normal. Holter showed no significant arrhythmia. On 11/1/18, a regular stress test and echo were done. No ischemia or arrhythmia noted by EKG criteria.  Lopressor started for mild hypertensive management palpitations.  December 2018, she underwent placement of Lap band and lost modest amount of weight.    Pravastatin 10 mg added for .  She underwent heart TKR with Dr. Nava on 3/3/2021 without complications.      She returns today for yearly follow-up.  In December 2021, she was treated for bronchitis with antibiotics, steroids, Sudafed/Bromfed.  After treatment, she noticed increased palpitations.  She increased her Lopressor to 25 mg twice daily with benefit.  Heart racing and palpitation significantly improved but continues to have occasional flutter/racing.  She continues to follow with rheumatology.  She has lost 14 pounds with weight watchers.       Cardiac History:    Past Surgical History:   Procedure Laterality Date   • CARDIOVASCULAR STRESS TEST  11/06/2018    5 Min. 7.0 METS. 86% THR.  BP- 179/76. Rare PVC. Negative.   • CONVERTED (HISTORICAL) HOLTER  2018    AVG HR 71 BPM.    • ECHO - CONVERTED  03/15/2017    EF 65%. Mild- Mod MR. RVSP- 35 mmHg.   • ECHO - CONVERTED  2018    EF 65%. Mild MR. RVSP- 22 mmHg. Small pericardial Effusion     Current Outpatient Medications   Medication Sig Dispense Refill   • Ascorbic Acid (VITAMIN C PO) Take 500 mg by mouth Daily.     • B Complex Vitamins (VITAMIN B COMPLEX PO) Take  by mouth Daily.     • diclofenac (VOLTAREN) 75 MG EC tablet Take 75 mg by mouth 2 (Two) Times a Day.     • Estradiol (CLIMARA TD) Place  on the skin as directed by provider 1 (One) Time Per Week.     • loratadine (CLARITIN) 10 MG tablet Take 10 mg by mouth Daily.     • metoprolol tartrate (LOPRESSOR) 25 MG tablet Take one tablet twice daily 180 tablet 3   • nortriptyline (PAMELOR) 25 MG capsule Take 25 mg by mouth Every Night.     • pravastatin (PRAVACHOL) 10 MG tablet Take 10 mg by mouth Every Night.     • traZODone (DESYREL) 50 MG tablet Take 50 mg by mouth Every Night.     • venlafaxine (EFFEXOR) 75 MG tablet Take 75 mg by mouth Daily.     • VITAMIN D, CHOLECALCIFEROL, PO Take  by mouth Daily.       No current facility-administered medications for this visit.     Patient has no known allergies.    Past Medical History:   Diagnosis Date   • Arthritis    • Hx of  section    • Hx of foot surgery 2018   • Hx of laparoscopic gastric banding 2018    Dr. Ornelas   • Hx of total knee replacement, right    • Mitral valve prolapse    • Osteoarthritis      Social History     Socioeconomic History   • Marital status:    Tobacco Use   • Smoking status: Never Smoker   • Smokeless tobacco: Never Used   Substance and Sexual Activity   • Alcohol use: No   • Drug use: No     Family History   Problem Relation Age of Onset   • Lung cancer Mother    • No Known Problems Father    • No Known Problems Brother    • No Known Problems Son    • No Known Problems  "Daughter      Review of Systems   Constitutional: Negative for decreased appetite and malaise/fatigue.   HENT: Negative.    Eyes: Negative for blurred vision.   Cardiovascular: Positive for palpitations. Negative for chest pain, dyspnea on exertion, leg swelling and syncope.   Respiratory: Negative for shortness of breath and sleep disturbances due to breathing.    Endocrine: Negative.    Hematologic/Lymphatic: Negative for bleeding problem. Does not bruise/bleed easily.   Skin: Negative.    Musculoskeletal: Negative for falls and myalgias.   Gastrointestinal: Negative for abdominal pain, heartburn and melena.   Genitourinary: Negative for hematuria.   Neurological: Negative for dizziness and light-headedness.   Psychiatric/Behavioral: Negative for altered mental status.   Allergic/Immunologic: Negative.       Objective     /60 (BP Location: Left arm)   Pulse 80   Temp 98.5 °F (36.9 °C)   Ht 170.2 cm (67.01\")   Wt 103 kg (228 lb)   BMI 35.70 kg/m²     Vitals and nursing note reviewed.   Constitutional:       General: Not in acute distress.     Appearance: Well-developed. Not diaphoretic.   Eyes:      Pupils: Pupils are equal, round, and reactive to light.   HENT:      Head: Normocephalic.   Pulmonary:      Effort: Pulmonary effort is normal. No respiratory distress.      Breath sounds: Normal breath sounds.   Cardiovascular:      Normal rate. Regular rhythm.   Pulses:     Intact distal pulses.   Edema:     Peripheral edema absent.   Abdominal:      General: Bowel sounds are normal.      Palpations: Abdomen is soft.   Musculoskeletal: Normal range of motion.      Cervical back: Normal range of motion. Skin:     General: Skin is warm and dry.   Neurological:      Mental Status: Alert and oriented to person, place, and time.        Procedures          Problem List Items Addressed This Visit        Cardiac and Vasculature    Palpitations    Relevant Orders    TSH (Completed)    Magnesium (Completed)    CBC " (No Diff) (Completed)    Comprehensive Metabolic Panel (Completed)    Vitamin D 25 Hydroxy       Endocrine and Metabolic    Metabolic syndrome - Primary    Relevant Orders    TSH (Completed)    Magnesium (Completed)    CBC (No Diff) (Completed)    Comprehensive Metabolic Panel (Completed)    Lipid Panel (Completed)    Vitamin D 25 Hydroxy      Other Visit Diagnoses     Hyperglycemia        Relevant Orders    TSH (Completed)    Magnesium (Completed)    CBC (No Diff) (Completed)    Comprehensive Metabolic Panel (Completed)    Vitamin D 25 Hydroxy    Hyperlipidemia LDL goal <100        Relevant Medications    pravastatin (PRAVACHOL) 10 MG tablet    Other Relevant Orders    TSH (Completed)    Magnesium (Completed)    CBC (No Diff) (Completed)    Comprehensive Metabolic Panel (Completed)    Lipid Panel (Completed)    Vitamin D 25 Hydroxy    Vitamin D deficiency        Relevant Orders    Vitamin D 25 Hydroxy         Palpitations- rhythm regular today without ectopic beats.  I explained to her the increase risk of PVCs/tachyarrhythia with steroids, Sudafed.  Continue Lopressor 25 mg twice daily.  Once palpitations improved, reduce evening dose to half tablet.  If symptoms persist, will repeat Holter monitor.  We will check TSH and magnesium level.  Limit caffeine.  Adequate fluid intake.    Hyperlipidemia managed with low-dose pravastatin.  She tolerates well.  We will repeat lipid panel to evaluate LDL reduction.    MVP/MR-clinically stable, echo in 2018 reviewed with her.  We will plan to repeat in the next 1 year or sooner if symptoms persist.    Check labs for CBC, CMP, TSH, magnesium, fasting lipid and vitamin D.  Copy will be forwarded when available.    Patient's Body mass index is 35.7 kg/m². indicating that she is obese (BMI >30).  She was congratulated on weight loss efforts.            Electronically signed by CAITLYN Maria,  January 19, 2022 21:00 EST

## 2022-01-18 NOTE — PATIENT INSTRUCTIONS
Premature Ventricular Contraction    A premature ventricular contraction (PVC) is a common kind of irregular heartbeat (arrhythmia). These contractions are extra heartbeats that start in the ventricles of the heart and occur too early in the normal sequence. During the PVC, the heart's normal electrical pathway is not used, so the beat is shorter and less effective. In most cases, these contractions come and go and do not require treatment.  What are the causes?  Common causes of the condition include:  · Smoking.  · Drinking alcohol.  · Certain medicines.  · Some illegal drugs.  · Stress.  · Caffeine.  Certain medical conditions can also cause PVCs:  · Heart failure.  · Heart attack, or coronary artery disease.  · Heart valve problems.  · Changes in minerals in the blood (electrolytes).  · Low blood oxygen levels or high carbon dioxide levels.  In many cases, the cause of this condition is not known.  What are the signs or symptoms?  The main symptom of this condition is fast or skipped heartbeats (palpitations). Other symptoms include:  · Chest pain.  · Shortness of breath.  · Feeling tired.  · Dizziness.  · Difficulty exercising.  In some cases, there are no symptoms.  How is this diagnosed?  This condition may be diagnosed based on:  · Your medical history.  · A physical exam. During the exam, the health care provider will check for irregular heartbeats.  · Tests, such as:  ? An ECG (electrocardiogram) to monitor the electrical activity of your heart.  ? An ambulatory cardiac monitor. This device records your heartbeats for 24 hours or more.  ? Stress tests to see how exercise affects your heart rhythm and blood supply.  ? An echocardiogram. This test uses sound waves (ultrasound) to produce an image of your heart.  ? An electrophysiology study (EPS). This test checks for electrical problems in your heart.  How is this treated?  Treatment for this condition depends on any underlying conditions, the type of PVCs  that you are having, and how much the symptoms are interfering with your daily life.  Possible treatments include:  · Avoiding things that cause premature contractions (triggers). These include caffeine and alcohol.  · Taking medicines if symptoms are severe or if the extra heartbeats are frequent.  · Getting treatment for underlying conditions that cause PVCs.  · Having an implantable cardioverter defibrillator (ICD), if you are at risk for a serious arrhythmia. The ICD is a small device that is inserted into your chest to monitor your heartbeat. When it senses an irregular heartbeat, it sends a shock to bring the heartbeat back to normal.  · Having a procedure to destroy the portion of the heart tissue that sends out abnormal signals (catheter ablation).  In some cases, no treatment is required.  Follow these instructions at home:  Lifestyle  · Do not use any products that contain nicotine or tobacco, such as cigarettes, e-cigarettes, and chewing tobacco. If you need help quitting, ask your health care provider.  · Do not use illegal drugs.  · Exercise regularly. Ask your health care provider what type of exercise is safe for you.  · Try to get at least 7-9 hours of sleep each night, or as much as recommended by your health care provider.  · Find healthy ways to manage stress. Avoid stressful situations when possible.  Alcohol use  · Do not drink alcohol if:  ? Your health care provider tells you not to drink.  ? You are pregnant, may be pregnant, or are planning to become pregnant.  ? Alcohol triggers your episodes.  · If you drink alcohol:  ? Limit how much you use to:  § 0-1 drink a day for women.  § 0-2 drinks a day for men.  · Be aware of how much alcohol is in your drink. In the [place] one drink equals one 12 oz bottle of beer (355 mL), one 5 oz glass of wine (148 mL), or one 1½ oz glass of hard liquor (44 mL).  General instructions  · Take over-the-counter and prescription medicines only as told by your  health care provider.  · If caffeine triggers episodes of PVC, do not eat, drink, or use anything with caffeine in it.  · Keep all follow-up visits as told by your health care provider. This is important.  Contact a health care provider if you:  · Feel palpitations.  Get help right away if you:  · Have chest pain.  · Have shortness of breath.  · Have sweating for no reason.  · Have nausea and vomiting.  · Become light-headed or you faint.  Summary  · A premature ventricular contraction (PVC) is a common kind of irregular heartbeat (arrhythmia).  · In most cases, these contractions come and go and do not require treatment.  · You may need to wear an ambulatory cardiac monitor. This records your heartbeats for 24 hours or more.  · Treatment depends on any underlying conditions, the type of PVCs that you are having, and how much the symptoms are interfering with your daily life.  This information is not intended to replace advice given to you by your health care provider. Make sure you discuss any questions you have with your health care provider.  Document Revised: 09/12/2019 Document Reviewed: 09/12/2019  Elsevier Patient Education © 2021 [employer]

## 2022-01-19 ENCOUNTER — LAB (OUTPATIENT)
Dept: LAB | Facility: HOSPITAL | Age: 52
End: 2022-01-19

## 2022-01-19 DIAGNOSIS — R00.2 PALPITATIONS: ICD-10-CM

## 2022-01-19 DIAGNOSIS — R73.9 HYPERGLYCEMIA: ICD-10-CM

## 2022-01-19 DIAGNOSIS — E55.9 VITAMIN D DEFICIENCY: ICD-10-CM

## 2022-01-19 DIAGNOSIS — E88.81 METABOLIC SYNDROME: ICD-10-CM

## 2022-01-19 DIAGNOSIS — E78.5 HYPERLIPIDEMIA LDL GOAL <100: ICD-10-CM

## 2022-01-19 LAB
ALBUMIN SERPL-MCNC: 4.11 G/DL (ref 3.5–5.2)
ALBUMIN/GLOB SERPL: 1.5 G/DL
ALP SERPL-CCNC: 71 U/L (ref 39–117)
ALT SERPL W P-5'-P-CCNC: 11 U/L (ref 1–33)
ANION GAP SERPL CALCULATED.3IONS-SCNC: 9.8 MMOL/L (ref 5–15)
AST SERPL-CCNC: 12 U/L (ref 1–32)
BILIRUB SERPL-MCNC: 0.3 MG/DL (ref 0–1.2)
BUN SERPL-MCNC: 17 MG/DL (ref 6–20)
BUN/CREAT SERPL: 21.3 (ref 7–25)
CALCIUM SPEC-SCNC: 9.4 MG/DL (ref 8.6–10.5)
CHLORIDE SERPL-SCNC: 105 MMOL/L (ref 98–107)
CHOLEST SERPL-MCNC: 161 MG/DL (ref 0–200)
CO2 SERPL-SCNC: 28.2 MMOL/L (ref 22–29)
CREAT SERPL-MCNC: 0.8 MG/DL (ref 0.57–1)
DEPRECATED RDW RBC AUTO: 50.9 FL (ref 37–54)
ERYTHROCYTE [DISTWIDTH] IN BLOOD BY AUTOMATED COUNT: 19.4 % (ref 12.3–15.4)
GFR SERPL CREATININE-BSD FRML MDRD: 76 ML/MIN/1.73
GLOBULIN UR ELPH-MCNC: 2.7 GM/DL
GLUCOSE SERPL-MCNC: 84 MG/DL (ref 65–99)
HCT VFR BLD AUTO: 33.7 % (ref 34–46.6)
HDLC SERPL-MCNC: 44 MG/DL (ref 40–60)
HGB BLD-MCNC: 9.5 G/DL (ref 12–15.9)
LDLC SERPL CALC-MCNC: 101 MG/DL (ref 0–100)
LDLC/HDLC SERPL: 2.29 {RATIO}
MAGNESIUM SERPL-MCNC: 2.2 MG/DL (ref 1.6–2.6)
MCH RBC QN AUTO: 20.9 PG (ref 26.6–33)
MCHC RBC AUTO-ENTMCNC: 28.2 G/DL (ref 31.5–35.7)
MCV RBC AUTO: 74.1 FL (ref 79–97)
PLATELET # BLD AUTO: 398 10*3/MM3 (ref 140–450)
PMV BLD AUTO: 9.7 FL (ref 6–12)
POTASSIUM SERPL-SCNC: 4.4 MMOL/L (ref 3.5–5.2)
PROT SERPL-MCNC: 6.8 G/DL (ref 6–8.5)
RBC # BLD AUTO: 4.55 10*6/MM3 (ref 3.77–5.28)
SODIUM SERPL-SCNC: 143 MMOL/L (ref 136–145)
TRIGL SERPL-MCNC: 82 MG/DL (ref 0–150)
TSH SERPL DL<=0.05 MIU/L-ACNC: 2.65 UIU/ML (ref 0.27–4.2)
VLDLC SERPL-MCNC: 16 MG/DL (ref 5–40)
WBC NRBC COR # BLD: 5.92 10*3/MM3 (ref 3.4–10.8)

## 2022-01-19 PROCEDURE — 36415 COLL VENOUS BLD VENIPUNCTURE: CPT

## 2022-01-19 PROCEDURE — 80053 COMPREHEN METABOLIC PANEL: CPT

## 2022-01-19 PROCEDURE — 80061 LIPID PANEL: CPT

## 2022-01-19 PROCEDURE — 84443 ASSAY THYROID STIM HORMONE: CPT

## 2022-01-19 PROCEDURE — 85027 COMPLETE CBC AUTOMATED: CPT

## 2022-01-19 PROCEDURE — 83735 ASSAY OF MAGNESIUM: CPT

## 2022-01-19 PROCEDURE — 82306 VITAMIN D 25 HYDROXY: CPT

## 2022-01-20 LAB — 25(OH)D3 SERPL-MCNC: 23.3 NG/ML (ref 30–100)

## 2022-09-22 ENCOUNTER — APPOINTMENT (OUTPATIENT)
Dept: WOMENS IMAGING | Facility: HOSPITAL | Age: 52
End: 2022-09-22

## 2022-09-22 PROCEDURE — 77063 BREAST TOMOSYNTHESIS BI: CPT | Performed by: RADIOLOGY

## 2022-09-22 PROCEDURE — 77067 SCR MAMMO BI INCL CAD: CPT | Performed by: RADIOLOGY

## 2023-01-16 ENCOUNTER — TELEPHONE (OUTPATIENT)
Dept: CARDIOLOGY | Facility: CLINIC | Age: 53
End: 2023-01-16
Payer: COMMERCIAL

## 2023-01-16 NOTE — TELEPHONE ENCOUNTER
Caller: Tasha Muñoz    Relationship: Self    Best call back number: [phone]     What is the best time to reach you: ANY    Who are you requesting to speak with (clinical staff, provider,  specific staff member): [facility] STAFF    Do you know the name of the person who called: PT    What was the call regarding: PT HAS 1 YEAR F/U 1/16/23 IS SICK AND NEEDS TO [facility] 1ST AVAILABLE IS 04/2023-PLEASE ADVISE IF APPT IS OK-PT STATES SHE MAY NEED REFILLS PRIOR TO-PLEASE CALL PT TO [facility]    Do you require a callback: YES

## 2023-03-09 ENCOUNTER — OFFICE VISIT (OUTPATIENT)
Dept: CARDIOLOGY | Facility: CLINIC | Age: 53
End: 2023-03-09
Payer: COMMERCIAL

## 2023-03-09 VITALS
SYSTOLIC BLOOD PRESSURE: 92 MMHG | HEIGHT: 67 IN | HEART RATE: 68 BPM | BODY MASS INDEX: 37.98 KG/M2 | DIASTOLIC BLOOD PRESSURE: 64 MMHG | WEIGHT: 242 LBS

## 2023-03-09 DIAGNOSIS — R00.2 PALPITATIONS: Primary | ICD-10-CM

## 2023-03-09 DIAGNOSIS — I34.0 MITRAL VALVE INSUFFICIENCY, UNSPECIFIED ETIOLOGY: ICD-10-CM

## 2023-03-09 DIAGNOSIS — I49.3 PVC (PREMATURE VENTRICULAR CONTRACTION): ICD-10-CM

## 2023-03-09 DIAGNOSIS — E88.81 METABOLIC SYNDROME: ICD-10-CM

## 2023-03-09 DIAGNOSIS — R06.02 SHORTNESS OF BREATH: ICD-10-CM

## 2023-03-09 DIAGNOSIS — I47.1 PAROXYSMAL SVT (SUPRAVENTRICULAR TACHYCARDIA): ICD-10-CM

## 2023-03-09 PROCEDURE — 93000 ELECTROCARDIOGRAM COMPLETE: CPT | Performed by: NURSE PRACTITIONER

## 2023-03-09 PROCEDURE — 99213 OFFICE O/P EST LOW 20 MIN: CPT | Performed by: NURSE PRACTITIONER

## 2023-03-09 RX ORDER — PANTOPRAZOLE SODIUM 40 MG/1
40 TABLET, DELAYED RELEASE ORAL DAILY
COMMUNITY

## 2023-03-09 RX ORDER — PRAVASTATIN SODIUM 20 MG
20 TABLET ORAL DAILY
COMMUNITY

## 2023-03-09 RX ORDER — SOTALOL HYDROCHLORIDE 80 MG/1
80 TABLET ORAL 2 TIMES DAILY
Qty: 60 TABLET | Refills: 3 | Status: SHIPPED | OUTPATIENT
Start: 2023-03-09 | End: 2023-03-10

## 2023-03-09 NOTE — PROGRESS NOTES
Chief Complaint   Patient presents with   • Follow-up     Cardiac management   • Lab     Last labs 5 weeks ago per PCP. She has order for labs per Dr Hill for next week.   • Palpitations     Had one episode of fluttering today with shortness of breath and fatigue.   • Med Refill     Needs refills on Metoprolol-90 day.     Subjective       Tasha Muñoz is a 52 y.o. female seen in November 2018 for initial cardiac evaluation of palpitations.  She has no previously diagnosed cardiac problems other than MVP diagnosed many years ago. In 2017, echo showed normal LVEF, mild to moderate mitral regurgitation. Electrolytes were normal. Holter showed no significant arrhythmia. On 11/1/18, a regular stress test and echo showed no ischemia, rare PVC, no ST changes. Lopressor started for mild HTN and palpitations.  December 2018, she underwent placement of Lap band and lost modest amount of weight. Pravastatin added for .  She underwent RTKR with Dr. Nava on 3/3/2021 without complications.      She presents today for follow up. Palpitations are infrequent, but today while working, she had sudden onset fluttering associated with flushing, fullness in chest, shortness of breath. The last episode was about a year ago and did not last long. Upon examination, she was tachycardic. EKG showed SVT, new finding. Denies dizziness or syncope. Drinks about 30 ounces of coffee daily, no different than usual. Labs 1/2022 showed anemia, she is being followed by gyn for uterine bleeding/fibroids. TSH and mag normal. LDL improved to 101.        Cardiac History:    Past Surgical History:   Procedure Laterality Date   • CARDIOVASCULAR STRESS TEST  11/06/2018    5 Min. 7.0 METS. 86% THR. BP- 179/76. Rare PVC. Negative.   • CONVERTED (HISTORICAL) HOLTER  11/26/2018    AVG HR 71 BPM.    • ECHO - CONVERTED  03/15/2017    EF 65%. Mild- Mod MR. RVSP- 35 mmHg.   • ECHO - CONVERTED  11/06/2018    EF 65%. Mild MR. RVSP- 22 mmHg.  Small pericardial Effusion     Current Outpatient Medications   Medication Sig Dispense Refill   • Ascorbic Acid (VITAMIN C PO) Take 500 mg by mouth Daily.     • B Complex Vitamins (VITAMIN B COMPLEX PO) Take  by mouth Daily.     • diclofenac (VOLTAREN) 75 MG EC tablet Take 1 tablet by mouth 2 (Two) Times a Day.     • Estradiol (CLIMARA TD) Place  on the skin as directed by provider 1 (One) Time Per Week.     • loratadine (CLARITIN) 10 MG tablet Take 1 tablet by mouth Daily As Needed.     • nortriptyline (PAMELOR) 25 MG capsule Take 1 capsule by mouth Every Night.     • pantoprazole (PROTONIX) 40 MG EC tablet Take 1 tablet by mouth Daily.     • pravastatin (PRAVACHOL) 20 MG tablet Take 1 tablet by mouth Daily.     • traZODone (DESYREL) 50 MG tablet Take 1 tablet by mouth Every Night.     • venlafaxine (EFFEXOR) 75 MG tablet Take 1 tablet by mouth Daily.     • VITAMIN D, CHOLECALCIFEROL, PO Take  by mouth Daily.     • Sotalol HCl AF 80 MG tablet Take 1/2 tablet bid 180 tablet 3     No current facility-administered medications for this visit.     Patient has no known allergies.    Past Medical History:   Diagnosis Date   • Arthritis    • Hx of  section    • Hx of foot surgery 2018   • Hx of laparoscopic gastric banding 2018    Dr. Ornelas   • Hx of total knee replacement, right    • Mitral valve prolapse    • Osteoarthritis    • Osteoarthritis      Social History     Socioeconomic History   • Marital status:    Tobacco Use   • Smoking status: Never   • Smokeless tobacco: Never   Vaping Use   • Vaping Use: Never used   Substance and Sexual Activity   • Alcohol use: No   • Drug use: No   • Sexual activity: Defer     Family History   Problem Relation Age of Onset   • Lung cancer Mother    • No Known Problems Father    • No Known Problems Brother    • No Known Problems Son    • No Known Problems Daughter      Review of Systems   Constitutional: Negative for decreased appetite and  "malaise/fatigue.   HENT: Negative.    Eyes: Negative for blurred vision.   Cardiovascular: Positive for dyspnea on exertion and palpitations. Negative for chest pain, leg swelling and syncope.   Respiratory: Negative for shortness of breath and sleep disturbances due to breathing.    Endocrine: Negative.    Hematologic/Lymphatic: Negative for bleeding problem. Does not bruise/bleed easily.   Skin: Negative.    Musculoskeletal: Negative for falls and myalgias.   Gastrointestinal: Negative for abdominal pain, heartburn and melena.   Genitourinary: Negative for hematuria.   Neurological: Positive for light-headedness. Negative for dizziness.   Psychiatric/Behavioral: Negative for altered mental status.   Allergic/Immunologic: Negative.       Objective     BP 92/64 (BP Location: Left arm)   Pulse 68   Ht 170.2 cm (67.01\")   Wt 110 kg (242 lb)   BMI 37.89 kg/m²     Vitals and nursing note reviewed.   Constitutional:       General: Not in acute distress.     Appearance: Well-developed. Not diaphoretic.   Eyes:      Pupils: Pupils are equal, round, and reactive to light.   HENT:      Head: Normocephalic.   Pulmonary:      Effort: Pulmonary effort is normal. No respiratory distress.      Breath sounds: Normal breath sounds.   Cardiovascular:      Tachycardia present. Occasional ectopic beats. Regular rhythm.   Pulses:     Intact distal pulses.   Edema:     Peripheral edema absent.   Abdominal:      General: Bowel sounds are normal.      Palpations: Abdomen is soft.   Musculoskeletal: Normal range of motion.      Cervical back: Normal range of motion. Skin:     General: Skin is warm and dry.   Neurological:      Mental Status: Alert and oriented to person, place, and time.          ECG 12 Lead    Date/Time: 3/9/2023 5:14 PM  Performed by: Kym Ley APRN  Authorized by: Kym Ley APRN   Comparison: not compared with previous ECG   Rhythm: supraventricular tachycardia  Ectopy: unifocal PVCs  Rate: tachycardic  BPM: " 144  ST Segments: ST segments normal    Clinical impression: abnormal EKG  Comments: QTc 449 ms                Problem List Items Addressed This Visit        Cardiac and Vasculature    Palpitations - Primary    Mitral valve insufficiency    Paroxysmal SVT (supraventricular tachycardia) (HCC)    PVC (premature ventricular contraction)       Endocrine and Metabolic    Metabolic syndrome       Pulmonary and Pneumonias    Shortness of breath      1. Tachypalpitations- EKG showed she is in SVT at 144 bpm. Symptoms began earlier today. Discussed with Dr. Melara. Advised to present to ER. Patient declined. Alternatively, she is advised to stop metoprolol. Start sotalol 80 mg, first dose tonight. Tomorrow take 40 mg in am and present to office for EKG. If she becomes symptomatic, will proceed to ER. Continue 40 mg BID. EP consult for possible ablation.     2. Mitral regurg- clinically stable. Echo reviewed.     3. Hypercholesterolemia- managed with pravastatin. Will check lipids once arrhythmia subsides.     Class 2 Severe Obesity (BMI >=35 and <=39.9). Obesity-related health conditions include the following: obstructive sleep apnea, hypertension, coronary heart disease, diabetes mellitus, dyslipidemias and GERD. Obesity is unchanged. BMI is is above average; BMI management plan is completed. We discussed portion control and increasing exercise.               Electronically signed by CAITLYN Maria,  March 10, 2023 20:30 EST

## 2023-03-10 ENCOUNTER — TELEPHONE (OUTPATIENT)
Dept: CARDIOLOGY | Facility: CLINIC | Age: 53
End: 2023-03-10

## 2023-03-10 ENCOUNTER — CLINICAL SUPPORT (OUTPATIENT)
Dept: CARDIOLOGY | Facility: CLINIC | Age: 53
End: 2023-03-10
Payer: COMMERCIAL

## 2023-03-10 DIAGNOSIS — R00.2 PALPITATIONS: ICD-10-CM

## 2023-03-10 DIAGNOSIS — I47.1 PAROXYSMAL SVT (SUPRAVENTRICULAR TACHYCARDIA): ICD-10-CM

## 2023-03-10 DIAGNOSIS — Z79.899 MEDICATION MANAGEMENT: Primary | ICD-10-CM

## 2023-03-10 PROBLEM — I47.10 PAROXYSMAL SVT (SUPRAVENTRICULAR TACHYCARDIA): Status: ACTIVE | Noted: 2023-03-10

## 2023-03-10 PROBLEM — I49.3 PVC (PREMATURE VENTRICULAR CONTRACTION): Status: ACTIVE | Noted: 2023-03-10

## 2023-03-10 PROCEDURE — 93000 ELECTROCARDIOGRAM COMPLETE: CPT | Performed by: NURSE PRACTITIONER

## 2023-03-10 NOTE — TELEPHONE ENCOUNTER
Please call and cancel Metoprolol prescription.   Patient informed to continue Sotalol at 40 mg bid. EKG sinus.   Referral to EP made.

## 2023-05-31 PROBLEM — Z79.899 LONG TERM CURRENT USE OF ANTIARRHYTHMIC DRUG: Status: ACTIVE | Noted: 2023-05-31

## 2023-05-31 PROBLEM — E78.2 MIXED HYPERLIPIDEMIA: Status: ACTIVE | Noted: 2023-05-31

## 2023-05-31 NOTE — PROGRESS NOTES
Cardiac Electrophysiology Outpatient Consult Note            [facility]    Consult Note     Tasha Muñoz  [id number]  06/02/2023  [phone]     Primary Care Physician: Romero Perales MD    Referred By: Libby Soliman APRN    Subjective     Chief Complaint:   Diagnoses and all orders for this visit:    1. Paroxysmal SVT (supraventricular tachycardia) (Primary)    2. Long term current use of antiarrhythmic drug    Other orders  -     ECG 12 Lead      Chief Complaint   Patient presents with   • Irregular Heart Beat     NEW PATIENT       History of Present Illness:   Tasha Muñoz is a 53 y.o. female who presents to my electrophysiology clinic for evaluation of SVT.  Patient notes several years of sudden onset rapid palpitations.  The episodes will last for hours to 8 hours and then stop afterwards she will feel profoundly fatigued.  These do not necessarily associate with any particular form of activity but often occur at rest.    The episodes will abruptly stop as well.  She was told that she has mitral valve prolapse and this was why she felt terrible.  Only after several years that she had an EKG during an event where we documented SVT.  Sotalol has not helped.  Metoprolol has not helped.  Episodes make her feel profoundly fatigued tired and anxious.    Past Medical History:   Patient Active Problem List    Diagnosis Date Noted   • Mixed hyperlipidemia 05/31/2023   • Long term current use of antiarrhythmic drug 05/31/2023   • Paroxysmal SVT (supraventricular tachycardia) 03/10/2023     Note Last Updated: 5/31/2023     · 14-day Holter monitor, 11/7/2018: No A-fib, no SVT, no heart block, no pauses, no SVT.  All events correlated to sinus rhythm  · Treadmill stress test, 11/6/2018: Rare PVCs noted during stress.  Negative stress test for stress-induced ischemia     • Metabolic syndrome 11/01/2018   • Murmur, cardiac 11/01/2018     Note Last Updated:  5/31/2023     · Echocardiogram, 11/6/2018: EF 60-65%.  Grade 1A diastolic dysfunction.  Normal valvular morphology         Past Surgical History:   Past Surgical History:   Procedure Laterality Date   • CARDIOVASCULAR STRESS TEST  11/06/2018    5 Min. 7.0 METS. 86% THR. BP- 179/76. Rare PVC. Negative.   • CONVERTED (HISTORICAL) HOLTER  11/26/2018    AVG HR 71 BPM.    • ECHO - CONVERTED  03/15/2017    EF 65%. Mild- Mod MR. RVSP- 35 mmHg.   • ECHO - CONVERTED  11/06/2018    EF 65%. Mild MR. RVSP- 22 mmHg. Small pericardial Effusion       Social History:   Social History     Socioeconomic History   • Marital status:    Tobacco Use   • Smoking status: Never   • Smokeless tobacco: Never   Vaping Use   • Vaping Use: Never used   Substance and Sexual Activity   • Alcohol use: No   • Drug use: No   • Sexual activity: Yes     Partners: Male     Birth control/protection: Patch, Post-menopausal       Medications:     Current Outpatient Medications:   •  Acetaminophen Extra Strength 500 MG tablet, Take 2 tablets by mouth As Needed. for pain, Disp: , Rfl:   •  Ascorbic Acid (VITAMIN C PO), Take 500 mg by mouth Daily., Disp: , Rfl:   •  B Complex Vitamins (VITAMIN B COMPLEX PO), Take  by mouth Daily., Disp: , Rfl:   •  diclofenac (VOLTAREN) 75 MG EC tablet, Take 1 tablet by mouth 2 (Two) Times a Day., Disp: , Rfl:   •  Estradiol (CLIMARA TD), Place  on the skin as directed by provider 1 (One) Time Per Week., Disp: , Rfl:   •  loratadine (CLARITIN) 10 MG tablet, Take 1 tablet by mouth Daily As Needed., Disp: , Rfl:   •  nortriptyline (PAMELOR) 25 MG capsule, Take 1 capsule by mouth Every Night., Disp: , Rfl:   •  pravastatin (PRAVACHOL) 20 MG tablet, Take 1 tablet by mouth Daily., Disp: , Rfl:   •  Sotalol HCl AF 80 MG tablet, Take 1/2 tablet bid, Disp: 180 tablet, Rfl: 3  •  traZODone (DESYREL) 50 MG tablet, Take 1 tablet by mouth Every Night., Disp: , Rfl:   •  venlafaxine (EFFEXOR) 75 MG tablet, Take 1 tablet by  "mouth Daily., Disp: , Rfl:   •  VITAMIN D, CHOLECALCIFEROL, PO, Take  by mouth As Needed., Disp: , Rfl:     Allergies:   No Known Allergies    Objective   Vital Signs:   Vitals:    06/02/23 0957   BP: 112/64   BP Location: Left arm   Patient Position: Sitting   Pulse: 77   SpO2: 98%   Weight: 104 kg (230 lb)   Height: 170.2 cm (67\")       PHYSICAL EXAM    Neck: no adenopathy, no carotid bruit, no JVD and thyroid: not enlarged  Lungs: clear to auscultation bilaterally and no rhonchi or crackles\", ' symmetric  Heart: regular rate and rhythm, S1, S2 normal, no murmur, click, rub or gallop  Abdomen: Soft, non-tender, bowel sounds normal,  no organomegaly  Extremities: extremities normal, atraumatic, no cyanosis or edema      Lab Results   Component Value Date    GLUCOSE 84 01/19/2022    CALCIUM 9.4 01/19/2022     01/19/2022    K 4.4 01/19/2022    CO2 28.2 01/19/2022     01/19/2022    BUN 17 01/19/2022    CREATININE 0.80 01/19/2022    EGFRIFNONA 76 01/19/2022    BCR 21.3 01/19/2022    ANIONGAP 9.8 01/19/2022     Lab Results   Component Value Date    WBC 5.92 01/19/2022    HGB 9.5 (L) 01/19/2022    HCT 33.7 (L) 01/19/2022    MCV 74.1 (L) 01/19/2022     01/19/2022     No results found for: INR, PROTIME  Lab Results   Component Value Date    TSH 2.650 01/19/2022       Cardiac Testing:            I personally viewed and interpreted the patient's EKG/Telemetry/lab data      ECG 12 Lead    Date/Time: 5/31/2023 2:52 PM  Performed by: Juan Manuel Stanley DO  Authorized by: Juan Manuel Stanley DO   Previous ECG: no previous ECG available  Rhythm: sinus rhythm            Tobacco Cessation: N/A  Obstructive Sleep Apnea Screening: Completed    Assessment & Plan    Diagnoses and all orders for this visit:    1. Paroxysmal SVT (supraventricular tachycardia) (Primary)    2. Long term current use of antiarrhythmic drug    Other orders  -     ECG 12 Lead         Diagnosis Plan   1. Paroxysmal SVT (supraventricular tachycardia) "   highly symptomatic recurrent paroxysmal SVT in the context of a structurally normal heart.  She has had episodes for some years now.  She has had breakthrough episodes despite initially metoprolol and then sotalol.    Discussed with her the potential long-term risk of sotalol which is low unless we have interaction with other medications specifically in the form of QT prolongation which can be life-threatening.    We also discussed the option of catheter ablation in detail.  I discussed with her the procedure concept diagnosis of different kinds of SVT that this is not surgery but rather percutaneous intravascular procedure.  I discussed with her a 1% chance of significant procedural complication risk including but not limited to bleeding at the groin cardiac perforation tamponade stroke myocardial infarction and a 0.3% chance of iatrogenicInjury to the AV node resulting in permanent complete heart block ultimately requiring a permanent pacemaker.  She understands these risk.    She wishes to proceed.    The patient and I made a mutually shared decision ( shared decision making model ) that the patient would be best served by proceeding with the above invasive heart procedure.  This is a high risk invasive cardiac procedure which comes with significant risk of morbidity and mortality.  This patient has significant underlying  heart disease.  Tasha Muñoz understands these risks and   has made an informed decision to proceed.    Stop sotalol 3 days before hand.      2. Long term current use of antiarrhythmic drug   as above.        Body mass index is 36.02 kg/m².    I spent 45 minutes in consultation with this patient which included more than 65% of this time in direct face-to-face counseling, physical examination and discussion of my assessment and findings and this shared decision making with the patient.  The remainder of the time not spent face-to-face was performing one, some or all of the following  actions: preparing to see the patient (e.g. reviewing tests, prior clinicians' notes, etc), ordering medications, tests or procedures, coordination of care, discussion of the plan with other healthcare providers, documenting clinical information in epic as well as independently interpreting results and communication of these results to the patient family and/or caregiver(s).  Please note that this explicitly excludes time spent on other separate billable services such as performing procedures or test interpretation, when applicable.    Follow Up:       Thank you for allowing me to participate in the care of your patient. Please to not hesitate to contact me with additional questions or concerns.      Juan Manuel Stanley DO, FACC, RS  Cardiac Electrophysiologist  [facility] / [facility]    Electronically signed by GRACIELA Hoff, 05/31/23, 2:52 PM EDT.

## 2023-06-02 ENCOUNTER — OFFICE VISIT (OUTPATIENT)
Dept: CARDIOLOGY | Facility: CLINIC | Age: 53
End: 2023-06-02

## 2023-06-02 VITALS
HEART RATE: 77 BPM | HEIGHT: 67 IN | BODY MASS INDEX: 36.1 KG/M2 | WEIGHT: 230 LBS | SYSTOLIC BLOOD PRESSURE: 112 MMHG | OXYGEN SATURATION: 98 % | DIASTOLIC BLOOD PRESSURE: 64 MMHG

## 2023-06-02 DIAGNOSIS — Z79.899 LONG TERM CURRENT USE OF ANTIARRHYTHMIC DRUG: ICD-10-CM

## 2023-06-02 DIAGNOSIS — I47.1 PAROXYSMAL SVT (SUPRAVENTRICULAR TACHYCARDIA): Primary | ICD-10-CM

## 2023-06-02 RX ORDER — PSEUDOEPHED/ACETAMINOPH/DIPHEN 30MG-500MG
1000 TABLET ORAL AS NEEDED
COMMUNITY
Start: 2023-04-06

## 2023-06-07 DIAGNOSIS — I47.1 PAROXYSMAL SVT (SUPRAVENTRICULAR TACHYCARDIA): Primary | ICD-10-CM

## 2023-06-15 ENCOUNTER — TELEPHONE (OUTPATIENT)
Dept: CARDIOLOGY | Facility: CLINIC | Age: 53
End: 2023-06-15
Payer: COMMERCIAL

## 2023-06-15 NOTE — TELEPHONE ENCOUNTER
Spoke with patient, she reports having chest tightness since starting sotalol and had not felt well. She did stop the sotalol and restarted Metoprolol. She reports feeling better since starting changing back to metoprolol, she just wanted to make you aware she did change her medication. Is scheduled for ablation soon with Dr Stanley.

## 2023-07-13 PROBLEM — I47.10 SVT (SUPRAVENTRICULAR TACHYCARDIA): Status: ACTIVE | Noted: 2023-07-13

## 2023-09-26 ENCOUNTER — OFFICE VISIT (OUTPATIENT)
Dept: CARDIOLOGY | Facility: CLINIC | Age: 53
End: 2023-09-26
Payer: COMMERCIAL

## 2023-09-26 VITALS
HEIGHT: 67 IN | WEIGHT: 249.2 LBS | BODY MASS INDEX: 39.11 KG/M2 | DIASTOLIC BLOOD PRESSURE: 80 MMHG | HEART RATE: 56 BPM | SYSTOLIC BLOOD PRESSURE: 110 MMHG

## 2023-09-26 DIAGNOSIS — E78.2 MIXED HYPERLIPIDEMIA: ICD-10-CM

## 2023-09-26 DIAGNOSIS — I47.10 SVT (SUPRAVENTRICULAR TACHYCARDIA): ICD-10-CM

## 2023-09-26 DIAGNOSIS — E88.810 METABOLIC SYNDROME: ICD-10-CM

## 2023-09-26 DIAGNOSIS — I47.10 PAROXYSMAL SVT (SUPRAVENTRICULAR TACHYCARDIA): Primary | ICD-10-CM

## 2023-09-26 PROCEDURE — 99214 OFFICE O/P EST MOD 30 MIN: CPT | Performed by: NURSE PRACTITIONER

## 2023-09-26 NOTE — LETTER
September 28, 2023       No Recipients    Patient: Tasha Muñoz   YOB: 1970   Date of Visit: 9/26/2023       Dear Romero Perales MD    Tasha Muñoz was in my office today. Below is a copy of my note.    If you have questions, please do not hesitate to call me. I look forward to following Tasha along with you.         Sincerely,        CAITLYN Maria        CC:   No Recipients    Chief Complaint   Patient presents with   • Follow-up     Cardiac management   • LABS     July 2023 results on chart   • Palpitations     Much better since having ablation, has had no palpitations since 7/2023.   • Med Refill     PCP writes refills.     Subjective      Tasha Muñoz is a 53 y.o. female seen in November 2018 for palpitations.  She was diagnosed with mitral valve prolapse many years ago.  Echo showed mild to moderate MR with no significant prolapse.  Holter showed no significant arrhythmia.  Stress test showed no ST changes and rare PVC.  She has been treated with Lopressor.  She presented for routine follow-up in March 2023.  She admitted to palpitations.  EKG showed she was in SVT at 144 during visit.  Sotalol started and she was referred to Dr. Stanley.  She converted to sinus.  She then underwent successful ablation on 7/13/2023.    She returns today for routine follow-up.  No recurrent palpitations/SVT.  She is pleased with the results of her procedure.  She denies anginal chest pain.  She does have mild dyspnea with exertion.  Her chief complaint is significant arthritis and weight gain.  She has tried multiple diets without success. Labs 3/16/2023 showed normal CMP, CBC.       Cardiac History:    Past Surgical History:   Procedure Laterality Date   • CARDIAC ELECTROPHYSIOLOGY PROCEDURE N/A 7/13/2023    Procedure: Ablation SVT;  Surgeon: Juan Manuel Stanley DO;  Location: [facility] INVASIVE LOCATION;  Service: Cardiovascular;  Laterality: N/A;  Hold Sotalol x 3 days prior   •  CARDIOVASCULAR STRESS TEST  2018    5 Min. 7.0 METS. 86% THR. BP- 179/76. Rare PVC. Negative.   • CONVERTED (HISTORICAL) HOLTER  2018    AVG HR 71 BPM.    • ECHO - CONVERTED  03/15/2017    EF 65%. Mild- Mod MR. RVSP- 35 mmHg.   • ECHO - CONVERTED  2018    EF 65%. Mild MR. RVSP- 22 mmHg. Small pericardial Effusion   • LAPAROSCOPIC GASTRIC BANDING     • REPLACEMENT TOTAL KNEE Right    • THUMB ARTHROSCOPY Left      Current Outpatient Medications   Medication Sig Dispense Refill   • Acetaminophen Extra Strength 500 MG tablet Take 2 tablets by mouth As Needed. for pain     • Ascorbic Acid (VITAMIN C PO) Take 500 mg by mouth Daily.     • B Complex Vitamins (VITAMIN B COMPLEX PO) Take 1 tablet by mouth Daily.     • diclofenac (VOLTAREN) 75 MG EC tablet Take 1 tablet by mouth 2 (Two) Times a Day.     • estradiol-norethindrone (COMBIPATCH) 0.05-0.14 MG/DAY patch Place 1 patch on the skin as directed by provider 2 (Two) Times a Week.     • nortriptyline (PAMELOR) 25 MG capsule Take 1 capsule by mouth Every Night.     • pravastatin (PRAVACHOL) 20 MG tablet Take 1 tablet by mouth Daily.     • traZODone (DESYREL) 50 MG tablet Take 1 tablet by mouth Every Night.     • venlafaxine (EFFEXOR) 75 MG tablet Take 1 tablet by mouth Daily.     • Semaglutide-Weight Management 0.25 MG/0.5ML solution auto-injector Inject 0.25 mg under the skin into the appropriate area as directed 1 (One) Time Per Week. 2 mL 1     No current facility-administered medications for this visit.     Patient has no known allergies.    Past Medical History:   Diagnosis Date   • Abnormal ECG    • Arthritis    • Hx of  section    • Hx of foot surgery 2018   • Hx of laparoscopic gastric banding 2018    Dr. Ornelas   • Hx of total knee replacement, right    • Hyperlipidemia    • Mitral valve prolapse    • Osteoarthritis    • Osteoarthritis    • PONV (postoperative nausea and vomiting)      Social History     Socioeconomic  "History   • Marital status:    Tobacco Use   • Smoking status: Never   • Smokeless tobacco: Never   Vaping Use   • Vaping Use: Never used   Substance and Sexual Activity   • Alcohol use: No   • Drug use: No   • Sexual activity: Yes     Partners: Male     Birth control/protection: Patch, Post-menopausal     Family History   Problem Relation Age of Onset   • Lung cancer Mother    • No Known Problems Father    • No Known Problems Brother    • No Known Problems Son    • No Known Problems Daughter      Review of Systems   Constitutional: Positive for malaise/fatigue and weight gain (+10). Negative for decreased appetite.   HENT: Negative.     Eyes:  Negative for blurred vision.   Cardiovascular:  Positive for dyspnea on exertion. Negative for chest pain, leg swelling, palpitations and syncope.   Respiratory:  Negative for shortness of breath and sleep disturbances due to breathing.    Endocrine: Negative.    Hematologic/Lymphatic: Negative for bleeding problem. Does not bruise/bleed easily.   Skin: Negative.    Musculoskeletal:  Positive for arthritis, joint pain and stiffness. Negative for falls.   Gastrointestinal:  Negative for abdominal pain, heartburn and melena.   Genitourinary:  Negative for hematuria.   Neurological:  Negative for dizziness and light-headedness.   Psychiatric/Behavioral:  Negative for altered mental status.    Allergic/Immunologic: Negative.       Objective    /80 (BP Location: Right arm)   Pulse 56   Ht 170.2 cm (67\")   Wt 113 kg (249 lb 3.2 oz)   BMI 39.03 kg/m²     Vitals and nursing note reviewed.   Constitutional:       General: Not in acute distress.     Appearance: Well-developed. Not diaphoretic.   Eyes:      Pupils: Pupils are equal, round, and reactive to light.   HENT:      Head: Normocephalic.   Pulmonary:      Effort: Pulmonary effort is normal. No respiratory distress.      Breath sounds: Normal breath sounds.   Cardiovascular:      Normal rate. Regular rhythm. "   Pulses:     Intact distal pulses.   Edema:     Peripheral edema absent.   Abdominal:      General: Bowel sounds are normal.      Palpations: Abdomen is soft.   Musculoskeletal: Normal range of motion.      Cervical back: Normal range of motion. Skin:     General: Skin is warm and dry.   Neurological:      Mental Status: Alert and oriented to person, place, and time.      Procedures          Problem List Items Addressed This Visit          Cardiac and Vasculature    Paroxysmal SVT (supraventricular tachycardia) - Primary    Overview     14-day Holter monitor, 11/7/2018: No A-fib, no SVT, no heart block, no pauses, no SVT.  All events correlated to sinus rhythm  Treadmill stress test, 11/6/2018: Rare PVCs noted during stress.  Negative stress test for stress-induced ischemia         Mixed hyperlipidemia    SVT (supraventricular tachycardia)       Endocrine and Metabolic    Metabolic syndrome        PSVT  -Successful radiofrequency ablation per Dr. Schaeffer on 7/13/2023  -She remains off beta-blocker    Metabolic syndrome  -Weight gain, BMI 39  -She has no personal or family history of MTC and would be an ideal candidate for GLP-1 agonist to aid in weight loss  -Wegovy 0.25 mg weekly prescribed    Hypercholesterolemia  -Managed with pravastatin 20 mg  -Lipids and LFT have been stable    Arthritis  -Discussed strategies to relieve arthritis pain, weight loss would be very beneficial    Follow-up in 6 months or sooner if needed.            Electronically signed by CAITLYN Maria,  September 28, 2023 14:44 EDT

## 2023-09-26 NOTE — PROGRESS NOTES
Chief Complaint   Patient presents with    Follow-up     Cardiac management    LABS     July 2023 results on chart    Palpitations     Much better since having ablation, has had no palpitations since 7/2023.    Med Refill     PCP writes refills.     Subjective       Tasha Muñoz is a 53 y.o. female seen in November 2018 for palpitations.  She was diagnosed with mitral valve prolapse many years ago.  Echo showed mild to moderate MR with no significant prolapse.  Holter showed no significant arrhythmia.  Stress test showed no ST changes and rare PVC.  She has been treated with Lopressor.  She presented for routine follow-up in March 2023.  She admitted to palpitations.  EKG showed she was in SVT at 144 during visit.  Sotalol started and she was referred to Dr. Stanley.  She converted to sinus.  She then underwent successful ablation on 7/13/2023.    She returns today for routine follow-up.  No recurrent palpitations/SVT.  She is pleased with the results of her procedure.  She denies anginal chest pain.  She does have mild dyspnea with exertion.  Her chief complaint is significant arthritis and weight gain.  She has tried multiple diets without success. Labs 3/16/2023 showed normal CMP, CBC.       Cardiac History:    Past Surgical History:   Procedure Laterality Date    CARDIAC ELECTROPHYSIOLOGY PROCEDURE N/A 7/13/2023    Procedure: Ablation SVT;  Surgeon: Juan Manuel Stanley DO;  Location: [facility] INVASIVE LOCATION;  Service: Cardiovascular;  Laterality: N/A;  Hold Sotalol x 3 days prior    CARDIOVASCULAR STRESS TEST  11/06/2018    5 Min. 7.0 METS. 86% THR. BP- 179/76. Rare PVC. Negative.    CONVERTED (HISTORICAL) HOLTER  11/26/2018    AVG HR 71 BPM.     ECHO - CONVERTED  03/15/2017    EF 65%. Mild- Mod MR. RVSP- 35 mmHg.    ECHO - CONVERTED  11/06/2018    EF 65%. Mild MR. RVSP- 22 mmHg. Small pericardial Effusion    LAPAROSCOPIC GASTRIC BANDING      REPLACEMENT TOTAL KNEE Right     THUMB ARTHROSCOPY Left       Current Outpatient Medications   Medication Sig Dispense Refill    Acetaminophen Extra Strength 500 MG tablet Take 2 tablets by mouth As Needed. for pain      Ascorbic Acid (VITAMIN C PO) Take 500 mg by mouth Daily.      B Complex Vitamins (VITAMIN B COMPLEX PO) Take 1 tablet by mouth Daily.      diclofenac (VOLTAREN) 75 MG EC tablet Take 1 tablet by mouth 2 (Two) Times a Day.      estradiol-norethindrone (COMBIPATCH) 0.05-0.14 MG/DAY patch Place 1 patch on the skin as directed by provider 2 (Two) Times a Week.      nortriptyline (PAMELOR) 25 MG capsule Take 1 capsule by mouth Every Night.      pravastatin (PRAVACHOL) 20 MG tablet Take 1 tablet by mouth Daily.      traZODone (DESYREL) 50 MG tablet Take 1 tablet by mouth Every Night.      venlafaxine (EFFEXOR) 75 MG tablet Take 1 tablet by mouth Daily.      Semaglutide-Weight Management 0.25 MG/0.5ML solution auto-injector Inject 0.25 mg under the skin into the appropriate area as directed 1 (One) Time Per Week. 2 mL 1     No current facility-administered medications for this visit.     Patient has no known allergies.    Past Medical History:   Diagnosis Date    Abnormal ECG     Arthritis     Hx of  section     Hx of foot surgery 2018    Hx of laparoscopic gastric banding 2018    Dr. Ornelas    Hx of total knee replacement, right     Hyperlipidemia     Mitral valve prolapse     Osteoarthritis     Osteoarthritis     PONV (postoperative nausea and vomiting)      Social History     Socioeconomic History    Marital status:    Tobacco Use    Smoking status: Never    Smokeless tobacco: Never   Vaping Use    Vaping Use: Never used   Substance and Sexual Activity    Alcohol use: No    Drug use: No    Sexual activity: Yes     Partners: Male     Birth control/protection: Patch, Post-menopausal     Family History   Problem Relation Age of Onset    Lung cancer Mother     No Known Problems Father     No Known Problems Brother     No Known Problems  "Son     No Known Problems Daughter      Review of Systems   Constitutional: Positive for malaise/fatigue and weight gain (+10). Negative for decreased appetite.   HENT: Negative.     Eyes:  Negative for blurred vision.   Cardiovascular:  Positive for dyspnea on exertion. Negative for chest pain, leg swelling, palpitations and syncope.   Respiratory:  Negative for shortness of breath and sleep disturbances due to breathing.    Endocrine: Negative.    Hematologic/Lymphatic: Negative for bleeding problem. Does not bruise/bleed easily.   Skin: Negative.    Musculoskeletal:  Positive for arthritis, joint pain and stiffness. Negative for falls.   Gastrointestinal:  Negative for abdominal pain, heartburn and melena.   Genitourinary:  Negative for hematuria.   Neurological:  Negative for dizziness and light-headedness.   Psychiatric/Behavioral:  Negative for altered mental status.    Allergic/Immunologic: Negative.       Objective     /80 (BP Location: Right arm)   Pulse 56   Ht 170.2 cm (67\")   Wt 113 kg (249 lb 3.2 oz)   BMI 39.03 kg/m²     Vitals and nursing note reviewed.   Constitutional:       General: Not in acute distress.     Appearance: Well-developed. Not diaphoretic.   Eyes:      Pupils: Pupils are equal, round, and reactive to light.   HENT:      Head: Normocephalic.   Pulmonary:      Effort: Pulmonary effort is normal. No respiratory distress.      Breath sounds: Normal breath sounds.   Cardiovascular:      Normal rate. Regular rhythm.   Pulses:     Intact distal pulses.   Edema:     Peripheral edema absent.   Abdominal:      General: Bowel sounds are normal.      Palpations: Abdomen is soft.   Musculoskeletal: Normal range of motion.      Cervical back: Normal range of motion. Skin:     General: Skin is warm and dry.   Neurological:      Mental Status: Alert and oriented to person, place, and time.      Procedures          Problem List Items Addressed This Visit          Cardiac and Vasculature    " Paroxysmal SVT (supraventricular tachycardia) - Primary    Overview     14-day Holter monitor, 11/7/2018: No A-fib, no SVT, no heart block, no pauses, no SVT.  All events correlated to sinus rhythm  Treadmill stress test, 11/6/2018: Rare PVCs noted during stress.  Negative stress test for stress-induced ischemia         Mixed hyperlipidemia    SVT (supraventricular tachycardia)       Endocrine and Metabolic    Metabolic syndrome        PSVT  -Successful radiofrequency ablation per Dr. Schaeffer on 7/13/2023  -She remains off beta-blocker    Metabolic syndrome  -Weight gain, BMI 39  -She has no personal or family history of MTC and would be an ideal candidate for GLP-1 agonist to aid in weight loss  -Wegovy 0.25 mg weekly prescribed    Hypercholesterolemia  -Managed with pravastatin 20 mg  -Lipids and LFT have been stable    Arthritis  -Discussed strategies to relieve arthritis pain, weight loss would be very beneficial    Follow-up in 6 months or sooner if needed.            Electronically signed by CAITLYN Maria,  September 28, 2023 14:44 EDT

## 2023-09-28 PROBLEM — Z86.79 STATUS POST RADIOFREQUENCY ABLATION FOR ARRHYTHMIA: Status: ACTIVE | Noted: 2023-09-28

## 2023-09-28 PROBLEM — Z98.890 STATUS POST RADIOFREQUENCY ABLATION FOR ARRHYTHMIA: Status: ACTIVE | Noted: 2023-09-28

## 2023-10-27 ENCOUNTER — OFFICE VISIT (OUTPATIENT)
Dept: CARDIOLOGY | Facility: CLINIC | Age: 53
End: 2023-10-27
Payer: COMMERCIAL

## 2023-10-27 VITALS
DIASTOLIC BLOOD PRESSURE: 78 MMHG | OXYGEN SATURATION: 97 % | HEART RATE: 83 BPM | BODY MASS INDEX: 37.35 KG/M2 | SYSTOLIC BLOOD PRESSURE: 132 MMHG | HEIGHT: 67 IN | WEIGHT: 238 LBS

## 2023-10-27 DIAGNOSIS — I47.10 PAROXYSMAL SVT (SUPRAVENTRICULAR TACHYCARDIA): Primary | ICD-10-CM

## 2023-10-27 PROBLEM — Z79.899 LONG TERM CURRENT USE OF ANTIARRHYTHMIC DRUG: Status: RESOLVED | Noted: 2023-05-31 | Resolved: 2023-10-27

## 2023-10-27 NOTE — PROGRESS NOTES
"        Encounter Date:10/27/2023      Patient ID: Tasha Muñoz is a 53 y.o. female.    Romeor Perales MD    Chief Complaint: Cheif Complaint EP: SVT    PROBLEM LIST:  Patient Active Problem List    Diagnosis Date Noted    Paroxysmal SVT (supraventricular tachycardia) 03/10/2023     Priority: High     Note Last Updated: 10/27/2023     14-day Holter monitor, 11/7/2018: No A-fib, no SVT, no heart block, no pauses, no SVT.  All events correlated to sinus rhythm  Treadmill stress test, 11/6/2018: Rare PVCs noted during stress.  Negative stress test for stress-induced ischemia  SVT due to AV matt reentry 7/13/2023      Mixed hyperlipidemia 05/31/2023    Metabolic syndrome 11/01/2018    Murmur, cardiac 11/01/2018     Note Last Updated: 5/31/2023     Echocardiogram, 11/6/2018: EF 60-65%.  Grade 1A diastolic dysfunction.  Normal valvular morphology                 History of Present Illness  Patient presents today for follow-up with a history of no known recurrence of palpitations, no dizziness no syncope.  Remains active.    No Known Allergies    Current Outpatient Medications   Medication Instructions    Acetaminophen Extra Strength 1,000 mg, Oral, As Needed, for pain    Ascorbic Acid (VITAMIN C PO) 500 mg, Oral, Daily    B Complex Vitamins (VITAMIN B COMPLEX PO) 1 tablet, Oral, Daily    diclofenac (VOLTAREN) 75 mg, Oral, 2 Times Daily    estradiol-norethindrone (COMBIPATCH) 0.05-0.14 MG/DAY patch 1 patch, Transdermal, 2 Times Weekly    nortriptyline (PAMELOR) 25 mg, Oral, Nightly    pravastatin (PRAVACHOL) 20 mg, Oral, Daily    traZODone (DESYREL) 50 mg, Oral, Nightly    venlafaxine (EFFEXOR) 75 mg, Oral, Daily       .    Objective:     /78 (BP Location: Left arm, Patient Position: Sitting)   Pulse 83   Ht 170.2 cm (67\")   Wt 108 kg (238 lb)   SpO2 97%   BMI 37.28 kg/m²    Body mass index is 37.28 kg/m².     Constitutional:       Appearance: Well-developed.   Pulmonary:      Effort: Pulmonary " effort is normal. No respiratory distress.      Breath sounds: Normal breath sounds. No wheezing. No rales.      Comments: Bases clear  Chest:      Chest wall: Not tender to palpatation.   Cardiovascular:      Normal rate. Regular rhythm.      Murmurs: There is no murmur.      No gallop.  No click. No rub.   Pulses:     Intact distal pulses.   Edema:     Peripheral edema absent.   Musculoskeletal: Normal range of motion.       Lab Review:     Lab Results   Component Value Date    GLUCOSE 92 07/13/2023    BUN 10 07/13/2023    CREATININE 0.66 07/13/2023    EGFR 105.0 07/13/2023    BCR 15.2 07/13/2023    K 4.2 07/13/2023    CO2 25.0 07/13/2023    CALCIUM 9.6 07/13/2023    ALBUMIN 4.11 01/19/2022    BILITOT 0.3 01/19/2022    AST 12 01/19/2022    ALT 11 01/19/2022     Lab Results   Component Value Date    WBC 5.86 07/13/2023    HGB 13.2 07/13/2023    HCT 41.6 07/13/2023    MCV 90.8 07/13/2023     07/13/2023     Lab Results   Component Value Date    TSH 2.650 01/19/2022           Procedures               Assessment:      Diagnosis Plan   1. Paroxysmal SVT (supraventricular tachycardia)  No known recurrence of SVT        Plan:     Stable cardiac status.  Continue current medications.   in 1 year, sooner as needed.  Thank you for allowing us to participate in the care of your patient.     Electronically signed by GRACIELA Hoff, 10/29/23, 5:01 PM EDT.

## 2023-12-20 ENCOUNTER — TELEPHONE (OUTPATIENT)
Dept: CARDIOLOGY | Facility: CLINIC | Age: 53
End: 2023-12-20
Payer: COMMERCIAL

## 2023-12-20 RX ORDER — SEMAGLUTIDE 0.5 MG/.5ML
0.5 INJECTION, SOLUTION SUBCUTANEOUS WEEKLY
Qty: 2 ML | Refills: 3 | Status: SHIPPED | OUTPATIENT
Start: 2023-12-20

## 2024-03-04 ENCOUNTER — TELEPHONE (OUTPATIENT)
Dept: CARDIOLOGY | Facility: CLINIC | Age: 54
End: 2024-03-04
Payer: COMMERCIAL

## 2024-03-04 NOTE — TELEPHONE ENCOUNTER
Received fax from Dr. Morales for cardiac clearance for patient to have a right hip replacement. According to our records, I do not see where patient is on any blood thinners or has had any stenting.          Fax [phone]

## 2024-04-16 ENCOUNTER — OFFICE VISIT (OUTPATIENT)
Dept: CARDIOLOGY | Facility: CLINIC | Age: 54
End: 2024-04-16
Payer: COMMERCIAL

## 2024-04-16 DIAGNOSIS — R01.1 MURMUR, CARDIAC: Primary | ICD-10-CM

## 2024-04-16 DIAGNOSIS — E78.2 MIXED HYPERLIPIDEMIA: ICD-10-CM

## 2024-04-16 DIAGNOSIS — E55.9 VITAMIN D DEFICIENCY: ICD-10-CM

## 2024-04-16 DIAGNOSIS — I47.10 PAROXYSMAL SVT (SUPRAVENTRICULAR TACHYCARDIA): ICD-10-CM

## 2024-04-16 DIAGNOSIS — E88.810 METABOLIC SYNDROME: ICD-10-CM

## 2024-04-16 PROCEDURE — 99213 OFFICE O/P EST LOW 20 MIN: CPT | Performed by: NURSE PRACTITIONER

## 2024-04-16 NOTE — PROGRESS NOTES
No chief complaint on file.    Gail Muñoz is a 53 y.o. femaleseen in November 2018 for palpitations.  She was diagnosed with mitral valve prolapse many years ago.  Echo showed mild to moderate MR with no significant prolapse.  Holter showed no significant arrhythmia.  Stress test showed no ST changes and rare PVC treated with Lopressor.  She presented for routine follow-up in March 2023.  She admitted to palpitations.  EKG showed she was in SVT at 144 during visit.  Sotalol started and she was referred to Dr. Stanley.  She converted to sinus.  She then underwent successful ablation on 7/13/2023.     She returns today for yearly follow up. She is doing well from a cardiac standpoint. No CP, SOB, no recurrent palpitations post ablation. She is off beta blockers completely. She underwent right hip replacement on 3/25/24 with Dr. Morales. Recovering well. Still using crutch for ambulation. Participating in PT. No recent labs.        Cardiac History:    Past Surgical History:   Procedure Laterality Date    CARDIAC ELECTROPHYSIOLOGY PROCEDURE N/A 7/13/2023    Procedure: Ablation SVT;  Surgeon: Juan Manuel Stanley DO;  Location: [facility] INVASIVE LOC[facility];  Service: Cardiovascular;  Laterality: N/A;  Hold Sotalol x 3 days prior    CARDIOVASCULAR STRESS TEST  11/06/2018    5 Min. 7.0 METS. 86% THR. BP- 179/76. Rare PVC. Negative.    CONVERTED (HISTORICAL) HOLTER  11/26/2018    AVG HR 71 BPM.     ECHO - CONVERTED  03/15/2017    EF 65%. Mild- Mod MR. RVSP- 35 mmHg.    ECHO - CONVERTED  11/06/2018    EF 65%. Mild MR. RVSP- 22 mmHg. Small pericardial Effusion    LAPAROSCOPIC GASTRIC BANDING      REPLACEMENT TOTAL KNEE Right     THUMB ARTHROSCOPY Left      Current Outpatient Medications   Medication Sig Dispense Refill    Acetaminophen Extra Strength 500 MG tablet Take 2 tablets by mouth As Needed. for pain      Ascorbic Acid (VITAMIN C PO) Take 500 mg by mouth Daily.      B Complex Vitamins (VITAMIN  B COMPLEX PO) Take 1 tablet by mouth Daily.      diclofenac (VOLTAREN) 75 MG EC tablet Take 1 tablet by mouth 2 (Two) Times a Day.      estradiol-norethindrone (COMBIPATCH) 0.05-0.14 MG/DAY patch Place 1 patch on the skin as directed by provider 2 (Two) Times a Week.      nortriptyline (PAMELOR) 25 MG capsule Take 1 capsule by mouth Every Night.      pravastatin (PRAVACHOL) 20 MG tablet Take 1 tablet by mouth Daily.      traZODone (DESYREL) 50 MG tablet Take 1 tablet by mouth Every Night.      venlafaxine (EFFEXOR) 75 MG tablet Take 1 tablet by mouth Daily.      Semaglutide-Weight Management (Wegovy) 0.5 MG/0.5ML solution auto-injector Inject 0.5 mL under the skin into the appropriate area as directed 1 (One) Time Per Week. (Patient not taking: Reported on 2024) 2 mL 3     No current facility-administered medications for this visit.     Patient has no known allergies.    Past Medical History:   Diagnosis Date    Abnormal ECG     Arthritis     Hx of  section     Hx of foot surgery 2018    Hx of laparoscopic gastric banding 2018    Dr. Ornleas    Hx of total knee replacement, right     Hyperlipidemia     Mitral valve prolapse     Osteoarthritis     Osteoarthritis     PONV (postoperative nausea and vomiting)      Social History     Socioeconomic History    Marital status:    Tobacco Use    Smoking status: Never    Smokeless tobacco: Never   Vaping Use    Vaping status: Never Used   Substance and Sexual Activity    Alcohol use: No    Drug use: No    Sexual activity: Yes     Partners: Male     Birth control/protection: Patch, Post-menopausal     Family History   Problem Relation Age of Onset    Lung cancer Mother     No Known Problems Father     No Known Problems Brother     No Known Problems Son     No Known Problems Daughter      Review of Systems   Constitutional: Negative for decreased appetite and malaise/fatigue.   HENT: Negative.     Eyes:  Negative for blurred vision.  "  Cardiovascular:  Negative for chest pain, dyspnea on exertion, leg swelling, palpitations and syncope.   Respiratory:  Negative for shortness of breath and sleep disturbances due to breathing.    Endocrine: Negative.    Hematologic/Lymphatic: Negative for bleeding problem. Does not bruise/bleed easily.   Skin: Negative.    Musculoskeletal:  Positive for arthritis and joint pain. Negative for falls and myalgias.   Gastrointestinal:  Negative for abdominal pain, heartburn and melena.   Genitourinary:  Negative for hematuria.   Neurological:  Negative for dizziness and light-headedness.   Psychiatric/Behavioral:  Negative for altered mental status.    Allergic/Immunologic: Negative.       Objective     /82   Pulse 72   Ht 170.2 cm (67\")   Wt 110 kg (243 lb)   BMI 38.06 kg/m²     Vitals and nursing note reviewed.   Constitutional:       General: Not in acute distress.     Appearance: Well-developed. Not diaphoretic.   Eyes:      Pupils: Pupils are equal, round, and reactive to light.   HENT:      Head: Normocephalic.   Pulmonary:      Effort: Pulmonary effort is normal. No respiratory distress.      Breath sounds: Normal breath sounds.   Cardiovascular:      Normal rate. Regular rhythm.   Pulses:     Intact distal pulses.   Edema:     Peripheral edema absent.   Abdominal:      General: Bowel sounds are normal.      Palpations: Abdomen is soft.   Musculoskeletal: Normal range of motion.      Cervical back: Normal range of motion. Skin:     General: Skin is warm and dry.   Neurological:      Mental Status: Alert and oriented to person, place, and time.        Procedures          Problem List Items Addressed This Visit          Cardiac and Vasculature    Murmur, cardiac - Primary    Overview     Echocardiogram, 11/6/2018: EF 60-65%.  Grade 1A diastolic dysfunction.  Normal valvular morphology         Relevant Orders    CBC (No Diff)    Comprehensive Metabolic Panel    Lipid Panel    Magnesium    TSH    " Paroxysmal SVT (supraventricular tachycardia)    Overview     14-day Holter monitor, 11/7/2018: No A-fib, no SVT, no heart block, no pauses, no SVT.  All events correlated to sinus rhythm  Treadmill stress test, 11/6/2018: Rare PVCs noted during stress.  Negative stress test for stress-induced ischemia  SVT due to AV matt reentry 7/13/2023         Relevant Orders    CBC (No Diff)    Comprehensive Metabolic Panel    Lipid Panel    Magnesium    TSH    Mixed hyperlipidemia    Relevant Orders    CBC (No Diff)    Comprehensive Metabolic Panel    Lipid Panel    Magnesium    TSH       Endocrine and Metabolic    Metabolic syndrome    Relevant Orders    CBC (No Diff)    Comprehensive Metabolic Panel    Lipid Panel    Magnesium    TSH     Other Visit Diagnoses       Vitamin D deficiency        Relevant Orders    Vitamin D,25-Hydroxy           PSVT  -Successful radiofrequency ablation per Dr. Schaeffer on 7/13/2023  -She remains off beta-blocker     Metabolic syndrome  -Wegovy prescribed to aid in weight loss efforts  -unable to get due to supply although insurance approved    Hypercholesterolemia  -Managed with pravastatin 20 mg  -Lipids and LFT have been stable  -recheck labs      Arthritis  -s/p right total hip replacement 3 weeks ago per Dr. Ashton.   -recovering well    FU 1 yr, sooner if needed.               Electronically signed by CAITLYN Maria,  April 19, 2024 11:10 EDT

## 2024-04-19 VITALS
HEART RATE: 72 BPM | SYSTOLIC BLOOD PRESSURE: 120 MMHG | BODY MASS INDEX: 38.14 KG/M2 | WEIGHT: 243 LBS | DIASTOLIC BLOOD PRESSURE: 82 MMHG | HEIGHT: 67 IN

## 2024-05-20 ENCOUNTER — LAB (OUTPATIENT)
Dept: LAB | Facility: HOSPITAL | Age: 54
End: 2024-05-20
Payer: COMMERCIAL

## 2024-05-20 DIAGNOSIS — E55.9 VITAMIN D DEFICIENCY: ICD-10-CM

## 2024-05-20 DIAGNOSIS — E88.810 METABOLIC SYNDROME: ICD-10-CM

## 2024-05-20 DIAGNOSIS — R01.1 MURMUR, CARDIAC: ICD-10-CM

## 2024-05-20 DIAGNOSIS — E78.2 MIXED HYPERLIPIDEMIA: ICD-10-CM

## 2024-05-20 DIAGNOSIS — I47.10 PAROXYSMAL SVT (SUPRAVENTRICULAR TACHYCARDIA): ICD-10-CM

## 2024-05-20 LAB
ALBUMIN SERPL-MCNC: 4.1 G/DL (ref 3.5–5.2)
ALBUMIN/GLOB SERPL: 1.4 G/DL
ALP SERPL-CCNC: 75 U/L (ref 39–117)
ALT SERPL W P-5'-P-CCNC: 40 U/L (ref 1–33)
ANION GAP SERPL CALCULATED.3IONS-SCNC: 9.9 MMOL/L (ref 5–15)
AST SERPL-CCNC: 29 U/L (ref 1–32)
BILIRUB SERPL-MCNC: 0.2 MG/DL (ref 0–1.2)
BUN SERPL-MCNC: 12 MG/DL (ref 6–20)
BUN/CREAT SERPL: 16.2 (ref 7–25)
CALCIUM SPEC-SCNC: 9.1 MG/DL (ref 8.6–10.5)
CHLORIDE SERPL-SCNC: 109 MMOL/L (ref 98–107)
CHOLEST SERPL-MCNC: 132 MG/DL (ref 0–200)
CO2 SERPL-SCNC: 24.1 MMOL/L (ref 22–29)
CREAT SERPL-MCNC: 0.74 MG/DL (ref 0.57–1)
DEPRECATED RDW RBC AUTO: 41.8 FL (ref 37–54)
EGFRCR SERPLBLD CKD-EPI 2021: 96.3 ML/MIN/1.73
ERYTHROCYTE [DISTWIDTH] IN BLOOD BY AUTOMATED COUNT: 13.3 % (ref 12.3–15.4)
GLOBULIN UR ELPH-MCNC: 2.9 GM/DL
GLUCOSE SERPL-MCNC: 103 MG/DL (ref 65–99)
HCT VFR BLD AUTO: 37.8 % (ref 34–46.6)
HDLC SERPL-MCNC: 36 MG/DL (ref 40–60)
HGB BLD-MCNC: 11.4 G/DL (ref 12–15.9)
LDLC SERPL CALC-MCNC: 71 MG/DL (ref 0–100)
LDLC/HDLC SERPL: 1.89 {RATIO}
MAGNESIUM SERPL-MCNC: 2.1 MG/DL (ref 1.6–2.6)
MCH RBC QN AUTO: 26 PG (ref 26.6–33)
MCHC RBC AUTO-ENTMCNC: 30.2 G/DL (ref 31.5–35.7)
MCV RBC AUTO: 86.1 FL (ref 79–97)
PLATELET # BLD AUTO: 344 10*3/MM3 (ref 140–450)
PMV BLD AUTO: 10.6 FL (ref 6–12)
POTASSIUM SERPL-SCNC: 4.6 MMOL/L (ref 3.5–5.2)
PROT SERPL-MCNC: 7 G/DL (ref 6–8.5)
RBC # BLD AUTO: 4.39 10*6/MM3 (ref 3.77–5.28)
SODIUM SERPL-SCNC: 143 MMOL/L (ref 136–145)
TRIGL SERPL-MCNC: 140 MG/DL (ref 0–150)
TSH SERPL DL<=0.05 MIU/L-ACNC: 2.3 UIU/ML (ref 0.27–4.2)
VLDLC SERPL-MCNC: 25 MG/DL (ref 5–40)
WBC NRBC COR # BLD AUTO: 5.25 10*3/MM3 (ref 3.4–10.8)

## 2024-05-20 PROCEDURE — 84443 ASSAY THYROID STIM HORMONE: CPT

## 2024-05-20 PROCEDURE — 80061 LIPID PANEL: CPT

## 2024-05-20 PROCEDURE — 82306 VITAMIN D 25 HYDROXY: CPT

## 2024-05-20 PROCEDURE — 36415 COLL VENOUS BLD VENIPUNCTURE: CPT

## 2024-05-20 PROCEDURE — 83735 ASSAY OF MAGNESIUM: CPT

## 2024-05-20 PROCEDURE — 80053 COMPREHEN METABOLIC PANEL: CPT

## 2024-05-20 PROCEDURE — 85027 COMPLETE CBC AUTOMATED: CPT

## 2024-05-21 LAB — 25(OH)D3 SERPL-MCNC: 27.4 NG/ML (ref 30–100)

## 2024-06-10 ENCOUNTER — PATIENT MESSAGE (OUTPATIENT)
Dept: CARDIOLOGY | Facility: CLINIC | Age: 54
End: 2024-06-10
Payer: COMMERCIAL

## 2024-06-10 ENCOUNTER — TELEPHONE (OUTPATIENT)
Dept: CARDIOLOGY | Facility: CLINIC | Age: 54
End: 2024-06-10
Payer: COMMERCIAL

## 2024-06-10 DIAGNOSIS — E78.2 MIXED HYPERLIPIDEMIA: ICD-10-CM

## 2024-06-10 DIAGNOSIS — M15.9 PRIMARY OSTEOARTHRITIS INVOLVING MULTIPLE JOINTS: ICD-10-CM

## 2024-06-10 DIAGNOSIS — E66.01 SEVERE OBESITY (BMI 35.0-39.9) WITH COMORBIDITY: ICD-10-CM

## 2024-06-10 DIAGNOSIS — E88.810 METABOLIC SYNDROME: Primary | ICD-10-CM

## 2024-06-10 NOTE — TELEPHONE ENCOUNTER
Patient prescribed Wegovy for significant metabolic syndrome, significant arthritis is undergone 2 knee replacements and hip replacement.    She was told by her pharmacy the cost was $1500 monthly.      Can we evaluate her for a 6 pound or Wegovy through specialty pharmacy clinic?

## 2024-06-11 ENCOUNTER — TELEPHONE (OUTPATIENT)
Dept: PHARMACY | Facility: HOSPITAL | Age: 54
End: 2024-06-11
Payer: COMMERCIAL

## 2024-06-20 ENCOUNTER — TELEPHONE (OUTPATIENT)
Age: 54
End: 2024-06-20
Payer: COMMERCIAL

## 2024-06-20 NOTE — TELEPHONE ENCOUNTER
It has been greater than three months.  Scheduled patient with CAITLYN Swartz on 6/25 for her shoulder injection.

## 2024-06-23 PROBLEM — M19.90 OSTEOARTHRITIS: Status: ACTIVE | Noted: 2024-06-23

## 2024-06-23 PROBLEM — M70.60 TROCHANTERIC BURSITIS: Status: ACTIVE | Noted: 2024-06-23

## 2024-06-23 PROBLEM — Z96.651 STATUS POST RIGHT PARTIAL KNEE REPLACEMENT: Status: ACTIVE | Noted: 2024-06-23

## 2024-06-23 PROBLEM — M25.50 ARTHRALGIA: Status: ACTIVE | Noted: 2024-06-23

## 2024-06-23 PROBLEM — M21.20: Status: ACTIVE | Noted: 2024-06-23

## 2024-06-25 ENCOUNTER — CLINICAL SUPPORT (OUTPATIENT)
Age: 54
End: 2024-06-25
Payer: COMMERCIAL

## 2024-06-25 VITALS
HEART RATE: 95 BPM | DIASTOLIC BLOOD PRESSURE: 84 MMHG | BODY MASS INDEX: 37.7 KG/M2 | SYSTOLIC BLOOD PRESSURE: 118 MMHG | HEIGHT: 67 IN | TEMPERATURE: 97.7 F | WEIGHT: 240.2 LBS

## 2024-06-25 DIAGNOSIS — M25.511 CHRONIC PAIN OF BOTH SHOULDERS: Primary | ICD-10-CM

## 2024-06-25 DIAGNOSIS — G89.29 CHRONIC PAIN OF BOTH SHOULDERS: Primary | ICD-10-CM

## 2024-06-25 DIAGNOSIS — M25.512 CHRONIC PAIN OF BOTH SHOULDERS: Primary | ICD-10-CM

## 2024-06-25 PROCEDURE — 20610 DRAIN/INJ JOINT/BURSA W/O US: CPT | Performed by: NURSE PRACTITIONER

## 2024-06-25 RX ORDER — PREDNISONE 5 MG/1
TABLET ORAL
Qty: 30 TABLET | Refills: 0 | Status: SHIPPED | OUTPATIENT
Start: 2024-06-25 | End: 2024-07-06

## 2024-06-25 RX ORDER — METHYLPREDNISOLONE ACETATE 40 MG/ML
40 INJECTION, SUSPENSION INTRA-ARTICULAR; INTRALESIONAL; INTRAMUSCULAR; SOFT TISSUE
Status: DISCONTINUED | OUTPATIENT
Start: 2024-06-25 | End: 2024-06-25 | Stop reason: HOSPADM

## 2024-06-25 RX ADMIN — METHYLPREDNISOLONE ACETATE 40 MG: 40 INJECTION, SUSPENSION INTRA-ARTICULAR; INTRALESIONAL; INTRAMUSCULAR; SOFT TISSUE at 13:42

## 2024-06-25 NOTE — PROGRESS NOTES
Office Visit       Date: 2024   Patient Name: Tasha Muñoz  MRN: [id number]  YOB: 1970    Referring Physician: Romero Perales, *     Chief Complaint:   Chief Complaint   Patient presents with    Left shoulder injection       History of Present Illness: Tasha Muñoz is a 54 y.o. female who is here today for left shoulder steroid injection.  She has not had this injected in the last 3 months.  She has failed conservative measures.      Subjective   Review of Systems:  Review of Systems   Musculoskeletal:  Positive for arthralgias.   All other systems reviewed and are negative.       Past Medical History:   Past Medical History:   Diagnosis Date    Abnormal ECG     Arthralgia     Arthritis     Back pain     Bilateral hand numbness     Elbow deformity     GERD (gastroesophageal reflux disease)     Hiatal hernia     Hx of  section     Hx of foot surgery 2018    Hx of laparoscopic gastric banding 2018    Dr. Ornelas    Hx of total knee replacement, right     Hyperlipidemia     Medial epicondylitis     Mitral valve prolapse     Mitral valve prolapse     NSAID long-term use     Osteoarthritis     FINGER AN RIGHT KNEE    PONV (postoperative nausea and vomiting)     TMJ arthritis     Trochanteric bursitis of both hips     Weight gain        Past Surgical History:   Past Surgical History:   Procedure Laterality Date    CARDIAC ELECTROPHYSIOLOGY PROCEDURE N/A 2023    Procedure: Ablation SVT;  Surgeon: Juan Manuel Stanley DO;  Location: [facility] INVAS[facility] LOC[facility];  Service: Cardiovascular;  Laterality: N/A;  Hold Sotalol x 3 days prior    CARDIOVASCULAR STRESS TEST  2018    5 Min. 7.0 METS. 86% THR. BP- 179/76. Rare PVC. Negative.     SECTION      CONVERTED (HISTORICAL) HOLTER  2018    AVG HR 71 BPM.     ECHO - CONVERTED  03/15/2017    EF 65%. Mild- Mod MR. RVSP- 35 mmHg.    ECHO - CONVERTED  2018    EF 65%. Mild MR.  RVSP- 22 mmHg. Small pericardial Effusion    KNEE SURGERY      ARTHROSCOPIC RIGHT KNEE    LAPAROSCOPIC GASTRIC BANDING      REPLACEMENT TOTAL KNEE Right     REPLACEMENT TOTAL KNEE      THUMB ARTHROSCOPY Left        Family History:   Family History   Problem Relation Age of Onset    Lung cancer Mother     No Known Problems Father     No Known Problems Brother     No Known Problems Son     No Known Problems Daughter        Social History:   Social History     Socioeconomic History    Marital status:    Tobacco Use    Smoking status: Never    Smokeless tobacco: Never   Vaping Use    Vaping status: Never Used   Substance and Sexual Activity    Alcohol use: No    Drug use: No    Sexual activity: Yes     Partners: Male     Birth control/protection: Patch, Post-menopausal       Medications:   Current Outpatient Medications:     Acetaminophen Extra Strength 500 MG tablet, Take 2 tablets by mouth As Needed. for pain, Disp: , Rfl:     Ascorbic Acid (VITAMIN C PO), Take 500 mg by mouth Daily., Disp: , Rfl:     B Complex Vitamins (VITAMIN B COMPLEX PO), Take 1 tablet by mouth Daily., Disp: , Rfl:     diclofenac (VOLTAREN) 75 MG EC tablet, Take 1 tablet by mouth 2 (Two) Times a Day., Disp: , Rfl:     estradiol-norethindrone (COMBIPATCH) 0.05-0.14 MG/DAY patch, Place 1 patch on the skin as directed by provider 2 (Two) Times a Week., Disp: , Rfl:     nortriptyline (PAMELOR) 25 MG capsule, Take 1 capsule by mouth Every Night., Disp: , Rfl:     Prasterone (Intrarosa) 6.5 MG insert, Insert  into the vagina Every Night. AS DIRECTED, Disp: , Rfl:     pravastatin (PRAVACHOL) 20 MG tablet, Take 1 tablet by mouth Daily., Disp: , Rfl:     traZODone (DESYREL) 50 MG tablet, Take 1 tablet by mouth Every Night., Disp: , Rfl:     venlafaxine (EFFEXOR) 75 MG tablet, Take 1 tablet by mouth Daily., Disp: , Rfl:     ketorolac (TORADOL) 10 MG tablet, Take 1 tablet by mouth Every 6 (Six) Hours As Needed for Moderate Pain., Disp: , Rfl:      "predniSONE (DELTASONE) 5 MG tablet, Take 4 tablets by mouth Daily for 3 days, THEN 3 tablets Daily for 3 days, THEN 2 tablets Daily for 3 days, THEN 1 tablet Daily for 3 days., Disp: 30 tablet, Rfl: 0    Semaglutide-Weight Management (Wegovy) 0.5 MG/0.5ML solution auto-injector, Inject 0.5 mL under the skin into the appropriate area as directed 1 (One) Time Per Week., Disp: 2 mL, Rfl: 3    Allergies: No Known Allergies    I have reviewed and updated the patient's chief complaint, history of present illness, review of systems, past medical history, surgical history, family history, social history, medications and allergy list as appropriate.     Objective    Vital Signs:   Vitals:    06/25/24 1339   BP: 118/84   BP Location: Left arm   Patient Position: Sitting   Cuff Size: Large Adult   Pulse: 95   Temp: 97.7 °F (36.5 °C)   Weight: 109 kg (240 lb 3.2 oz)   Height: 170.2 cm (67.01\")   PainSc:   4   PainLoc: Shoulder  Comment: left     Body mass index is 37.61 kg/m².           Physical Exam:  Physical Exam  Constitutional:       Appearance: Normal appearance. She is obese.   Cardiovascular:      Rate and Rhythm: Normal rate and regular rhythm.   Pulmonary:      Effort: Pulmonary effort is normal.   Musculoskeletal:      Comments: Tender left shoulder   Neurological:      General: No focal deficit present.      Mental Status: She is alert and oriented to person, place, and time.   Psychiatric:         Mood and Affect: Mood normal.         Behavior: Behavior normal.         Thought Content: Thought content normal.          Results Review:   Imaging Results (Last 24 Hours)       ** No results found for the last 24 hours. **            Arthrocentesis    Date/Time: 6/25/2024 1:42 PM    Performed by: Arely Yost APRN  Authorized by: Arely Yost APRN  Indications: pain   Body area: shoulder  Joint: left shoulder  Local anesthesia used: yes    Anesthesia:  Local anesthesia used: yes  Local Anesthetic: " lidocaine 1% without epinephrine  Anesthetic total: 1 mL    Sedation:  Patient sedated: no    Needle gauge: 25.  Ultrasound guidance: no  Meds administered: 40 mg methylPREDNISolone acetate 40 MG/ML  Patient tolerance: patient tolerated the procedure well with no immediate complications          Assessment / Plan    Assessment/Plan:   Diagnoses and all orders for this visit:    1. Chronic pain of both shoulders (Primary)  Assessment & Plan:  Left shoulder steroid injection 6/25/2024    Consent form reviewed and signed.  See procedure documentation  Patient can have shoulder injected every 3 months as needed.  She notes much worsening pain and stiffness.  Both shoulders are waking her up at night.  We can trial a prednisone taper starting tomorrow if the injection does not help.  Prednisone taper sent.    Orders:  -     Arthrocentesis  -     predniSONE (DELTASONE) 5 MG tablet; Take 4 tablets by mouth Daily for 3 days, THEN 3 tablets Daily for 3 days, THEN 2 tablets Daily for 3 days, THEN 1 tablet Daily for 3 days.  Dispense: 30 tablet; Refill: 0        Follow Up:   Return for Next scheduled follow up.        CAITLYN Butler  [facility]

## 2024-06-25 NOTE — ASSESSMENT & PLAN NOTE
Left shoulder steroid injection 6/25/2024    Consent form reviewed and signed.  See procedure documentation  Patient can have shoulder injected every 3 months as needed.  She notes much worsening pain and stiffness.  Both shoulders are waking her up at night.  We can trial a prednisone taper starting tomorrow if the injection does not help.  Prednisone taper sent.

## 2024-08-02 PROBLEM — M72.2 PLANTAR FASCIITIS: Status: ACTIVE | Noted: 2024-08-02

## 2024-08-02 PROBLEM — K21.9 GASTROESOPHAGEAL REFLUX DISEASE: Status: ACTIVE | Noted: 2024-08-02

## 2024-08-02 PROBLEM — K44.9 HIATAL HERNIA: Status: ACTIVE | Noted: 2024-08-02

## 2024-08-02 PROBLEM — M21.221: Status: ACTIVE | Noted: 2024-06-23

## 2024-08-02 PROBLEM — Z79.1 LONG TERM (CURRENT) USE OF NON-STEROIDAL ANTI-INFLAMMATORIES (NSAID): Status: ACTIVE | Noted: 2024-08-02

## 2024-08-05 ENCOUNTER — OFFICE VISIT (OUTPATIENT)
Age: 54
End: 2024-08-05
Payer: COMMERCIAL

## 2024-08-05 VITALS
BODY MASS INDEX: 36.87 KG/M2 | TEMPERATURE: 97.9 F | DIASTOLIC BLOOD PRESSURE: 72 MMHG | SYSTOLIC BLOOD PRESSURE: 110 MMHG | WEIGHT: 234.9 LBS | HEART RATE: 81 BPM | HEIGHT: 67 IN

## 2024-08-05 DIAGNOSIS — M25.511 CHRONIC PAIN OF BOTH SHOULDERS: Chronic | ICD-10-CM

## 2024-08-05 DIAGNOSIS — M19.041 PRIMARY OSTEOARTHRITIS OF BOTH HANDS: ICD-10-CM

## 2024-08-05 DIAGNOSIS — M19.042 PRIMARY OSTEOARTHRITIS OF BOTH HANDS: ICD-10-CM

## 2024-08-05 DIAGNOSIS — K21.9 GASTROESOPHAGEAL REFLUX DISEASE, UNSPECIFIED WHETHER ESOPHAGITIS PRESENT: ICD-10-CM

## 2024-08-05 DIAGNOSIS — G89.29 CHRONIC PAIN OF BOTH SHOULDERS: Chronic | ICD-10-CM

## 2024-08-05 DIAGNOSIS — M70.62 TROCHANTERIC BURSITIS OF BOTH HIPS: ICD-10-CM

## 2024-08-05 DIAGNOSIS — M21.221: ICD-10-CM

## 2024-08-05 DIAGNOSIS — M25.50 ARTHRALGIA, UNSPECIFIED JOINT: Primary | ICD-10-CM

## 2024-08-05 DIAGNOSIS — M70.61 TROCHANTERIC BURSITIS OF BOTH HIPS: ICD-10-CM

## 2024-08-05 DIAGNOSIS — M72.2 PLANTAR FASCIITIS: ICD-10-CM

## 2024-08-05 DIAGNOSIS — M25.512 CHRONIC PAIN OF BOTH SHOULDERS: Chronic | ICD-10-CM

## 2024-08-05 DIAGNOSIS — Z79.1 LONG TERM (CURRENT) USE OF NON-STEROIDAL ANTI-INFLAMMATORIES (NSAID): ICD-10-CM

## 2024-08-05 PROCEDURE — 99214 OFFICE O/P EST MOD 30 MIN: CPT | Performed by: NURSE PRACTITIONER

## 2024-08-05 RX ORDER — ONDANSETRON 4 MG/1
TABLET, ORALLY DISINTEGRATING ORAL
COMMUNITY
Start: 2024-07-18

## 2024-08-05 RX ORDER — DICLOFENAC SODIUM 75 MG/1
75 TABLET, DELAYED RELEASE ORAL 2 TIMES DAILY PRN
Qty: 180 TABLET | Refills: 1 | Status: SHIPPED | OUTPATIENT
Start: 2024-08-05

## 2024-08-05 NOTE — ASSESSMENT & PLAN NOTE
NEGATIVE RF, CCP, SAMEER, [id number]. PRIMARILY HANDS AND FEET, SOMEWHAT IN KNEES.  NO SYNOVITIS.  Diffuse and generalized OA.    Stable.   R elbow and R 1st CMC joints and right hip are the worst areas.  Voltaren helps.   Pt global is 5/10 and VAS is 5/10.  Reviewed labs in [facility] from May 2024.   F/U in 6 months.

## 2024-08-05 NOTE — ASSESSMENT & PLAN NOTE
Mild   failed: Daypro  She originally had a partial right knee replacement with Dr. Nava in June 2015.  This was converted to a total knee March 2021.  She did well with this.    Continue diclofenac  She had total right hip replacement March 2024.  She has done very well with this.

## 2024-08-05 NOTE — ASSESSMENT & PLAN NOTE
Seeing Dr. Whittaker  Replacement was scheduled. There is a continued issue obtaining hardware, therefore, the surgery is on hold.   This is still bothersome.

## 2024-08-05 NOTE — ASSESSMENT & PLAN NOTE
Left shoulder steroid injection 6/25/2024.  She had great relief  with this.    Right shoulder was hurting her on 6/25/2024.  I sent her a prednisone taper at that time.  She declined PT order at this time.  Hurts with laying on shoulder.  ? Bursitis.

## 2024-08-05 NOTE — PROGRESS NOTES
Office Visit       Date: 2024   Patient Name: Tasha Muñoz  MRN: [id number]  YOB: 1970    Referring Physician: Romero Perales, *     Chief Complaint:   Chief Complaint   Patient presents with    Osteoarthritis       History of Present Illness: Tasha Muñoz is a 54 y.o. female who is here today for follow-up of osteoarthritis.  Today she reports feeling the same.  She rates her pain 5 out of 10 with 1 hour of morning stiffness.  She has started on semaglutide since we last saw her.  She denies any recent injuries or infections.  She needs refills of diclofenac today.      Subjective   Review of Systems:  Review of Systems   Musculoskeletal:  Positive for arthralgias, back pain and joint swelling.   All other systems reviewed and are negative.       Past Medical History:   Past Medical History:   Diagnosis Date    Abnormal ECG     Arthralgia     Arthritis     Back pain     Bilateral hand numbness     Elbow deformity     GERD (gastroesophageal reflux disease)     Hiatal hernia     Hx of  section     Hx of foot surgery 2018    Hx of laparoscopic gastric banding 2018    Dr. Ornelas    Hx of total knee replacement, right     Hyperlipidemia     Medial epicondylitis     Mitral valve prolapse     Mitral valve prolapse     NSAID long-term use     Osteoarthritis     FINGER AN RIGHT KNEE    PONV (postoperative nausea and vomiting)     TMJ arthritis     Trochanteric bursitis of both hips     Weight gain        Past Surgical History:   Past Surgical History:   Procedure Laterality Date    CARDIAC ELECTROPHYSIOLOGY PROCEDURE N/A 2023    Procedure: Ablation SVT;  Surgeon: Juan Manuel Stanley DO;  Location: [facility] INVASIVE LOCATION;  Service: Cardiovascular;  Laterality: N/A;  Hold Sotalol x 3 days prior    CARDIOVASCULAR STRESS TEST  2018    5 Min. 7.0 METS. 86% THR. BP- 179/76. Rare PVC. Negative.     SECTION      CONVERTED (HISTORICAL)  HOLTER  11/26/2018    AVG HR 71 BPM.     ECHO - CONVERTED  03/15/2017    EF 65%. Mild- Mod MR. RVSP- 35 mmHg.    ECHO - CONVERTED  11/06/2018    EF 65%. Mild MR. RVSP- 22 mmHg. Small pericardial Effusion    KNEE SURGERY      ARTHROSCOPIC RIGHT KNEE    LAPAROSCOPIC GASTRIC BANDING      REPLACEMENT TOTAL KNEE Right     REPLACEMENT TOTAL KNEE      THUMB ARTHROSCOPY Left        Family History:   Family History   Problem Relation Age of Onset    Lung cancer Mother     No Known Problems Father     No Known Problems Brother     No Known Problems Son     No Known Problems Daughter        Social History:   Social History     Socioeconomic History    Marital status:    Tobacco Use    Smoking status: Never    Smokeless tobacco: Never   Vaping Use    Vaping status: Never Used   Substance and Sexual Activity    Alcohol use: No    Drug use: No    Sexual activity: Yes     Partners: Male     Birth control/protection: Patch, Post-menopausal       Medications:   Current Outpatient Medications:     Ascorbic Acid (VITAMIN C PO), Take 500 mg by mouth Daily., Disp: , Rfl:     B Complex Vitamins (VITAMIN B COMPLEX PO), Take 1 tablet by mouth Daily., Disp: , Rfl:     diclofenac (VOLTAREN) 75 MG EC tablet, Take 1 tablet by mouth 2 (Two) Times a Day As Needed (joint pain)., Disp: 180 tablet, Rfl: 1    estradiol-norethindrone (COMBIPATCH) 0.05-0.14 MG/DAY patch, Place 1 patch on the skin as directed by provider 2 (Two) Times a Week., Disp: , Rfl:     nortriptyline (PAMELOR) 25 MG capsule, Take 1 capsule by mouth Every Night., Disp: , Rfl:     ondansetron ODT (ZOFRAN-ODT) 4 MG disintegrating tablet, DISSOLVE ONE TABLET BY MOUTH EVERY SIX HOURS AS NEEDED, Disp: , Rfl:     Prasterone (Intrarosa) 6.5 MG insert, Insert  into the vagina Every Night. AS DIRECTED, Disp: , Rfl:     pravastatin (PRAVACHOL) 20 MG tablet, Take 1 tablet by mouth Daily., Disp: , Rfl:     traZODone (DESYREL) 50 MG tablet, Take 1 tablet by mouth Every Night.,  "Disp: , Rfl:     venlafaxine (EFFEXOR) 75 MG tablet, Take 1 tablet by mouth Daily., Disp: , Rfl:     Allergies: No Known Allergies    I have reviewed and updated the patient's chief complaint, history of present illness, review of systems, past medical history, surgical history, family history, social history, medications and allergy list as appropriate.     Objective    Vital Signs:   Vitals:    08/05/24 1023   BP: 110/72   BP Location: Left arm   Patient Position: Sitting   Cuff Size: Adult   Pulse: 81   Temp: 97.9 °F (36.6 °C)   Weight: 107 kg (234 lb 14.4 oz)   Height: 170.2 cm (67.01\")   PainSc:   5     Body mass index is 36.78 kg/m².   She is taking semaglutide.    Physical Exam:  Physical Exam  Vitals reviewed.   Constitutional:       Appearance: Normal appearance. She is obese.   HENT:      Head: Normocephalic and atraumatic.      Mouth/Throat:      Mouth: Mucous membranes are moist.   Eyes:      Conjunctiva/sclera: Conjunctivae normal.   Cardiovascular:      Rate and Rhythm: Normal rate and regular rhythm.      Pulses: Normal pulses.      Heart sounds: Normal heart sounds.   Pulmonary:      Effort: Pulmonary effort is normal.      Breath sounds: Normal breath sounds.   Musculoskeletal:         General: Normal range of motion.      Cervical back: Normal range of motion and neck supple.      Comments: She has had tendon transfer left thumb.  She has had right hip replaced and has good ROM.  No synovitis or soft tissue swelling.   Skin:     General: Skin is warm and dry.   Neurological:      General: No focal deficit present.      Mental Status: She is alert and oriented to person, place, and time. Mental status is at baseline.   Psychiatric:         Mood and Affect: Mood normal.         Behavior: Behavior normal.         Thought Content: Thought content normal.         Judgment: Judgment normal.          Results Review:   Imaging Results (Last 24 Hours)       ** No results found for the last 24 hours. **      "       Procedures    Assessment / Plan    Assessment/Plan:   Diagnoses and all orders for this visit:    1. Arthralgia, unspecified joint (Primary)  Assessment & Plan:  NEGATIVE RF, CCP, SAMEER, HLAB27. PRIMARILY HANDS AND FEET, SOMEWHAT IN KNEES.  NO SYNOVITIS.  Diffuse and generalized OA.    Stable.   R elbow and R 1st CMC joints and right hip are the worst areas.  Voltaren helps.   Pt global is 5/10 and VAS is 5/10.  Reviewed labs in [facility] from May 2024.   F/U in 6 months.      2. Primary osteoarthritis of both hands  Assessment & Plan:  Mild   failed: Daypro  She originally had a partial right knee replacement with Dr. Nava in June 2015.  This was converted to a total knee March 2021.  She did well with this.    Continue diclofenac  She had total right hip replacement March 2024.  She has done very well with this.      3. Chronic pain of both shoulders  Assessment & Plan:  Left shoulder steroid injection 6/25/2024.  She had great relief  with this.    Right shoulder was hurting her on 6/25/2024.  I sent her a prednisone taper at that time.  She declined PT order at this time.  Hurts with laying on shoulder.  ? Bursitis.      4. Trochanteric bursitis of both hips  Assessment & Plan:  R>L  Right 2/14/2023  Left 1/26/23 steroid injection    She had right hip replaced March 2024.      5. Plantar fasciitis  Assessment & Plan:  Dr. Kent    Doing well today  managed by podiatry in [place].       6. Flexion deformity, right elbow  Assessment & Plan:  Seeing Dr. Whittaker  Replacement was scheduled. There is a continued issue obtaining hardware, therefore, the surgery is on hold.   This is still bothersome.      7. Long term (current) use of non-steroidal anti-inflammatories (nsaid)  Assessment & Plan:  Diclofenac    Risks of NSAIDs discussed including GI upset, GI bleeding, renal and hepatic risks and the risks of cardiovascular disease. Warned not to take with other NSAIDs including OTC NSAIDs.      8.  Gastroesophageal reflux disease, unspecified whether esophagitis present  Assessment & Plan:  Doing well. Reflux resolved after lap band removed.       Other orders  -     diclofenac (VOLTAREN) 75 MG EC tablet; Take 1 tablet by mouth 2 (Two) Times a Day As Needed (joint pain).  Dispense: 180 tablet; Refill: 1        Follow Up:   Return in about 6 months (around 2/5/2025) for CAITLYN Swartz.        CAITLYN Butler  [facility]

## 2024-08-05 NOTE — ASSESSMENT & PLAN NOTE
Diclofenac    Risks of NSAIDs discussed including GI upset, GI bleeding, renal and hepatic risks and the risks of cardiovascular disease. Warned not to take with other NSAIDs including OTC NSAIDs.

## 2024-11-01 ENCOUNTER — OFFICE VISIT (OUTPATIENT)
Dept: CARDIOLOGY | Facility: CLINIC | Age: 54
End: 2024-11-01
Payer: COMMERCIAL

## 2024-11-01 VITALS
OXYGEN SATURATION: 98 % | HEART RATE: 85 BPM | BODY MASS INDEX: 34.21 KG/M2 | SYSTOLIC BLOOD PRESSURE: 130 MMHG | WEIGHT: 218 LBS | DIASTOLIC BLOOD PRESSURE: 84 MMHG | HEIGHT: 67 IN

## 2024-11-01 DIAGNOSIS — I47.10 PAROXYSMAL SVT (SUPRAVENTRICULAR TACHYCARDIA): Primary | ICD-10-CM

## 2024-11-01 PROBLEM — M25.50 ARTHRALGIA: Status: RESOLVED | Noted: 2024-06-23 | Resolved: 2024-11-01

## 2024-11-01 PROCEDURE — 99213 OFFICE O/P EST LOW 20 MIN: CPT | Performed by: INTERNAL MEDICINE

## 2024-11-01 NOTE — PROGRESS NOTES
Encounter Date:11/01/2024      Patient ID: Tasha Muñoz is a 54 y.o. female.    Romero Perales MD    Cheif Complaint EP: SVT    PROBLEM LIST:  Patient Active Problem List    Diagnosis Date Noted    Paroxysmal SVT (supraventricular tachycardia) 03/10/2023     Priority: High     Note Last Updated: 10/27/2023     14-day Holter monitor, 11/7/2018: No A-fib, no SVT, no heart block, no pauses, no SVT.  All events correlated to sinus rhythm  Treadmill stress test, 11/6/2018: Rare PVCs noted during stress.  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  Grade 1A diastolic dysfunction.  Normal valvular morphology      Plantar fasciitis 08/02/2024    Long term (current) use of non-steroidal anti-inflammatories (nsaid) 08/02/2024    Gastroesophageal reflux disease 08/02/2024    Hiatal hernia 08/02/2024    Chronic pain of both shoulders 06/25/2024    Trochanteric bursitis 06/23/2024     Note Last Updated: 6/23/2024     BOTH HIPS      Osteoarthritis 06/23/2024     Note Last Updated: 6/23/2024     FINGER      Flexion deformity, right elbow 06/23/2024     Note Last Updated: 6/23/2024     R/ELBOW      Mixed hyperlipidemia 05/31/2023    Metabolic syndrome 11/01/2018             History of Present Illness  Patient presents today for follow-up with a history of ablation of AV node reentry tachycardia.  She returns today for annual electrophysiology follow-up.  She reports that over the year she has had 3 occasions of awareness of palpitations which may be lasted 2 seconds and were self-limiting.  She had no sustained arrhythmias.    No Known Allergies    Current Outpatient Medications   Medication Instructions    Ascorbic Acid (VITAMIN C PO) 500 mg, Daily    B Complex Vitamins (VITAMIN B COMPLEX PO) 1 tablet, Daily    diclofenac (VOLTAREN) 75 mg,  "Oral, 2 Times Daily PRN    estradiol-norethindrone (COMBIPATCH) 0.05-0.14 MG/DAY patch 1 patch, 2 Times Weekly    nortriptyline (PAMELOR) 25 mg, Nightly    ondansetron ODT (ZOFRAN-ODT) 4 MG disintegrating tablet DISSOLVE ONE TABLET BY MOUTH EVERY SIX HOURS AS NEEDED    Prasterone (Intrarosa) 6.5 MG insert Nightly    pravastatin (PRAVACHOL) 20 mg, Daily    Semaglutide-Weight Management 0.5 MG/0.5ML solution auto-injector Weekly    traZODone (DESYREL) 50 mg, Nightly    venlafaxine (EFFEXOR) 75 mg, Daily       .    Objective:     /84 (BP Location: Right arm, Patient Position: Sitting, Cuff Size: Adult)   Pulse 85   Ht 170.2 cm (67\")   Wt 98.9 kg (218 lb)   SpO2 98%   BMI 34.14 kg/m²    Body mass index is 34.14 kg/m².     Vitals reviewed.   Constitutional:       Appearance: Well-developed.   Pulmonary:      Effort: Pulmonary effort is normal. No respiratory distress.      Breath sounds: Normal breath sounds. No wheezing. No rales.      Comments: Bases clear  Chest:      Chest wall: Not tender to palpatation.   Cardiovascular:      Normal rate. Regular rhythm.      Murmurs: There is no murmur.      No gallop.  No click. No rub.   Pulses:     Intact distal pulses.   Edema:     Peripheral edema absent.   Musculoskeletal: Normal range of motion.       Lab Review:     Lab Results   Component Value Date    GLUCOSE 103 (H) 05/20/2024    BUN 12 05/20/2024    CREATININE 0.74 05/20/2024    EGFR 96.3 05/20/2024    BCR 16.2 05/20/2024    K 4.6 05/20/2024    CO2 24.1 05/20/2024    CALCIUM 9.1 05/20/2024    ALBUMIN 4.1 05/20/2024    BILITOT 0.2 05/20/2024    AST 29 05/20/2024    ALT 40 (H) 05/20/2024     Lab Results   Component Value Date    WBC 5.25 05/20/2024    HGB 11.4 (L) 05/20/2024    HCT 37.8 05/20/2024    MCV 86.1 05/20/2024     05/20/2024     Lab Results   Component Value Date    TSH 2.300 05/20/2024           Procedures               Assessment:      Diagnosis Plan   1. Paroxysmal SVT (supraventricular " tachycardia)  No known reoccurrence of sustained SVT.  Continue to monitor        Plan:     Advance Care Planning   ACP discussion was declined by the patient. Patient has an advance directive (not in EMR), copy requested.           Stable cardiac status.  Continue current medications.   in 1 year, sooner as needed.  Thank you for allowing us to participate in the care of your patient.     Electronically signed by GRACIELA Hoff, 11/01/24, 10:52 AM EDT.

## 2025-01-30 NOTE — ASSESSMENT & PLAN NOTE
Mild   failed: Daypro  She originally had a partial right knee replacement with Dr. Nava in June 2015.  This was converted to a total knee March 2021.  She did well with this.    Continue diclofenac  She had total right hip replacement March 2024.  She has done very well with this.  She is having left hip pain

## 2025-01-30 NOTE — ASSESSMENT & PLAN NOTE
Seeing Dr. Whittaker  Replacement was scheduled. There is a continued issue obtaining hardware, therefore, the surgery is on hold.   For three weeks she has more pain, sharp in nature.  She followed with Dr. Whittaker recently and he has ordered CT.  He is going to try to build a prosthetic.

## 2025-02-03 ENCOUNTER — OFFICE VISIT (OUTPATIENT)
Age: 55
End: 2025-02-03
Payer: COMMERCIAL

## 2025-02-03 ENCOUNTER — LAB (OUTPATIENT)
Facility: HOSPITAL | Age: 55
End: 2025-02-03
Payer: COMMERCIAL

## 2025-02-03 VITALS
HEIGHT: 67 IN | DIASTOLIC BLOOD PRESSURE: 70 MMHG | BODY MASS INDEX: 34.69 KG/M2 | WEIGHT: 221 LBS | SYSTOLIC BLOOD PRESSURE: 130 MMHG | TEMPERATURE: 97.6 F | HEART RATE: 88 BPM

## 2025-02-03 DIAGNOSIS — M25.511 CHRONIC PAIN OF BOTH SHOULDERS: Chronic | ICD-10-CM

## 2025-02-03 DIAGNOSIS — M19.041 PRIMARY OSTEOARTHRITIS OF BOTH HANDS: ICD-10-CM

## 2025-02-03 DIAGNOSIS — M25.512 CHRONIC PAIN OF BOTH SHOULDERS: Chronic | ICD-10-CM

## 2025-02-03 DIAGNOSIS — G89.29 CHRONIC PAIN OF BOTH SHOULDERS: Chronic | ICD-10-CM

## 2025-02-03 DIAGNOSIS — M70.61 TROCHANTERIC BURSITIS OF BOTH HIPS: ICD-10-CM

## 2025-02-03 DIAGNOSIS — M72.2 PLANTAR FASCIITIS: ICD-10-CM

## 2025-02-03 DIAGNOSIS — M19.041 PRIMARY OSTEOARTHRITIS OF BOTH HANDS: Primary | ICD-10-CM

## 2025-02-03 DIAGNOSIS — M70.62 TROCHANTERIC BURSITIS OF BOTH HIPS: ICD-10-CM

## 2025-02-03 DIAGNOSIS — M19.042 PRIMARY OSTEOARTHRITIS OF BOTH HANDS: ICD-10-CM

## 2025-02-03 DIAGNOSIS — K21.9 GASTROESOPHAGEAL REFLUX DISEASE, UNSPECIFIED WHETHER ESOPHAGITIS PRESENT: ICD-10-CM

## 2025-02-03 DIAGNOSIS — Z79.1 LONG TERM (CURRENT) USE OF NON-STEROIDAL ANTI-INFLAMMATORIES (NSAID): ICD-10-CM

## 2025-02-03 DIAGNOSIS — M21.221: ICD-10-CM

## 2025-02-03 DIAGNOSIS — M19.042 PRIMARY OSTEOARTHRITIS OF BOTH HANDS: Primary | ICD-10-CM

## 2025-02-03 LAB
DEPRECATED RDW RBC AUTO: 43.6 FL (ref 37–54)
ERYTHROCYTE [DISTWIDTH] IN BLOOD BY AUTOMATED COUNT: 14.6 % (ref 12.3–15.4)
HCT VFR BLD AUTO: 38.8 % (ref 34–46.6)
HGB BLD-MCNC: 12.7 G/DL (ref 12–15.9)
MCH RBC QN AUTO: 27 PG (ref 26.6–33)
MCHC RBC AUTO-ENTMCNC: 32.7 G/DL (ref 31.5–35.7)
MCV RBC AUTO: 82.4 FL (ref 79–97)
PLATELET # BLD AUTO: 358 10*3/MM3 (ref 140–450)
PMV BLD AUTO: 10.1 FL (ref 6–12)
RBC # BLD AUTO: 4.71 10*6/MM3 (ref 3.77–5.28)
WBC NRBC COR # BLD AUTO: 7.38 10*3/MM3 (ref 3.4–10.8)

## 2025-02-03 PROCEDURE — 80053 COMPREHEN METABOLIC PANEL: CPT

## 2025-02-03 PROCEDURE — 99214 OFFICE O/P EST MOD 30 MIN: CPT | Performed by: NURSE PRACTITIONER

## 2025-02-03 PROCEDURE — 85007 BL SMEAR W/DIFF WBC COUNT: CPT

## 2025-02-03 PROCEDURE — 85027 COMPLETE CBC AUTOMATED: CPT

## 2025-02-03 PROCEDURE — 36415 COLL VENOUS BLD VENIPUNCTURE: CPT

## 2025-02-03 RX ORDER — PREDNISONE 5 MG/1
5 TABLET ORAL DAILY
COMMUNITY
Start: 2025-01-07

## 2025-02-03 RX ORDER — DICLOFENAC SODIUM 75 MG/1
75 TABLET, DELAYED RELEASE ORAL 2 TIMES DAILY PRN
Qty: 180 TABLET | Refills: 1 | Status: SHIPPED | OUTPATIENT
Start: 2025-02-03

## 2025-02-03 NOTE — PROGRESS NOTES
Office Visit       Date: 2025   Patient Name: Tasha Muñoz  MRN: [id number]  YOB: 1970    Referring Physician: No ref. provider found     Chief Complaint:   Chief Complaint   Patient presents with    Follow-up    Osteoarthritis       History of Present Illness: Tasha Muñoz is a 54 y.o. female who is here today osteoarthritis.  Today she reports feeling the same.  She rates her pain 7 out of 10 and has 1 hour morning stiffness.  She would like a refill of her diclofenac 75 mg tablets.      Subjective   Review of Systems:  Review of Systems   Musculoskeletal:  Positive for arthralgias, back pain and joint swelling.   All other systems reviewed and are negative.       Past Medical History:   Past Medical History:   Diagnosis Date    Abnormal ECG     Arthralgia     Arthritis     Back pain     Bilateral hand numbness     Elbow deformity     GERD (gastroesophageal reflux disease)     Hiatal hernia     Hx of  section     Hx of foot surgery 2018    Hx of laparoscopic gastric banding 2018    Dr. Ornelas    Hx of total knee replacement, right     Hyperlipidemia     Medial epicondylitis     Mitral valve prolapse     Mitral valve prolapse     NSAID long-term use     Osteoarthritis     FINGER AN RIGHT KNEE    PONV (postoperative nausea and vomiting)     TMJ arthritis     Trochanteric bursitis of both hips     Weight gain        Past Surgical History:   Past Surgical History:   Procedure Laterality Date    CARDIAC ELECTROPHYSIOLOGY PROCEDURE N/A 2023    Procedure: Ablation SVT;  Surgeon: Juan Manuel Stanley DO;  Location: [facility] INVASIVE LOCATION;  Service: Cardiovascular;  Laterality: N/A;  Hold Sotalol x 3 days prior    CARDIOVASCULAR STRESS TEST  2018    5 Min. 7.0 METS. 86% THR. BP- 179/76. Rare PVC. Negative.     SECTION      CONVERTED (HISTORICAL) HOLTER  2018    AVG HR 71 BPM.     ECHO - CONVERTED  03/15/2017    EF 65%.  Mild- Mod MR. RVSP- 35 mmHg.    ECHO - CONVERTED  11/06/2018    EF 65%. Mild MR. RVSP- 22 mmHg. Small pericardial Effusion    KNEE SURGERY      ARTHROSCOPIC RIGHT KNEE    LAPAROSCOPIC GASTRIC BANDING      REPLACEMENT TOTAL KNEE Right     REPLACEMENT TOTAL KNEE      THUMB ARTHROSCOPY Left     TOTAL HIP ARTHROPLASTY Right 03/2024       Family History:   Family History   Problem Relation Age of Onset    Lung cancer Mother     No Known Problems Father     No Known Problems Brother     No Known Problems Son     No Known Problems Daughter        Social History:   Social History     Socioeconomic History    Marital status:    Tobacco Use    Smoking status: Never    Smokeless tobacco: Never   Vaping Use    Vaping status: Never Used   Substance and Sexual Activity    Alcohol use: No    Drug use: No    Sexual activity: Yes     Partners: Male     Birth control/protection: Patch, Post-menopausal       Medications:   Current Outpatient Medications:     Ascorbic Acid (VITAMIN C PO), Take 500 mg by mouth Daily., Disp: , Rfl:     B Complex Vitamins (VITAMIN B COMPLEX PO), Take 1 tablet by mouth Daily., Disp: , Rfl:     diclofenac (VOLTAREN) 75 MG EC tablet, Take 1 tablet by mouth 2 (Two) Times a Day As Needed (joint pain)., Disp: 180 tablet, Rfl: 1    estradiol-norethindrone (COMBIPATCH) 0.05-0.14 MG/DAY patch, Place 1 patch on the skin as directed by provider 2 (Two) Times a Week., Disp: , Rfl:     nortriptyline (PAMELOR) 25 MG capsule, Take 1 capsule by mouth Every Night., Disp: , Rfl:     ondansetron ODT (ZOFRAN-ODT) 4 MG disintegrating tablet, DISSOLVE ONE TABLET BY MOUTH EVERY SIX HOURS AS NEEDED, Disp: , Rfl:     Prasterone (Intrarosa) 6.5 MG insert, Insert  into the vagina Every Night. AS DIRECTED, Disp: , Rfl:     pravastatin (PRAVACHOL) 20 MG tablet, Take 1 tablet by mouth Daily., Disp: , Rfl:     predniSONE (DELTASONE) 5 MG tablet, Take 1 tablet by mouth Daily., Disp: , Rfl:     Semaglutide-Weight Management 0.5  "MG/0.5ML solution auto-injector, Inject  under the skin into the appropriate area as directed 1 (One) Time Per Week., Disp: , Rfl:     traZODone (DESYREL) 50 MG tablet, Take 1 tablet by mouth Every Night., Disp: , Rfl:     venlafaxine (EFFEXOR) 75 MG tablet, Take 1 tablet by mouth Daily., Disp: , Rfl:     Allergies: No Known Allergies    I have reviewed and updated the patient's chief complaint, history of present illness, review of systems, past medical history, surgical history, family history, social history, medications and allergy list as appropriate.     Objective    Vital Signs:   Vitals:    02/03/25 1608   BP: 130/70   Pulse: 88   Temp: 97.6 °F (36.4 °C)   Weight: 100 kg (221 lb)   Height: 170.2 cm (67\")   PainSc:   7   PainLoc: Back  Comment: hips, hands.     Body mass index is 34.61 kg/m².           Physical Exam:  Physical Exam  Vitals reviewed.   Constitutional:       Appearance: Normal appearance.   HENT:      Head: Normocephalic and atraumatic.      Mouth/Throat:      Mouth: Mucous membranes are moist.   Eyes:      Conjunctiva/sclera: Conjunctivae normal.   Cardiovascular:      Rate and Rhythm: Normal rate and regular rhythm.      Pulses: Normal pulses.      Heart sounds: Normal heart sounds.   Pulmonary:      Effort: Pulmonary effort is normal.      Breath sounds: Normal breath sounds.   Musculoskeletal:         General: Normal range of motion.      Cervical back: Normal range of motion and neck supple.      Comments: Flexion deformity right elbow  Right elbow tender  Crepitus left knee  Right hip and knee replaced  No soft tissue swelling  [place] bilaterally   Skin:     General: Skin is warm and dry.   Neurological:      General: No focal deficit present.      Mental Status: She is alert and oriented to person, place, and time. Mental status is at baseline.   Psychiatric:         Mood and Affect: Mood normal.         Behavior: Behavior normal.         Thought Content: Thought content normal.        "  Judgment: Judgment normal.          Results Review:   Imaging Results (Last 24 Hours)       ** No results found for the last 24 hours. **            Procedures    Assessment / Plan    Assessment/Plan:   Diagnoses and all orders for this visit:    1. Primary osteoarthritis of both hands (Primary)  Assessment & Plan:  Mild   failed: Daypro  She originally had a partial right knee replacement with Dr. Nava in June 2015.  This was converted to a total knee March 2021.  She did well with this.    Continue diclofenac  She had total right hip replacement March 2024.  She has done very well with this.  She is having left hip pain      2. Plantar fasciitis  Assessment & Plan:  Dr. Kent    Doing well today  managed by podiatry in [place].       3. Flexion deformity, right elbow  Assessment & Plan:  Seeing Dr. Whittaker  Replacement was scheduled. There is a continued issue obtaining hardware, therefore, the surgery is on hold.   For three weeks she has more pain, sharp in nature.  She followed with Dr. Whittaker recently and he has ordered CT.  He is going to try to build a prosthetic.      4. Trochanteric bursitis of both hips  Assessment & Plan:  R>L  Right 2/14/2023  Left 1/26/23 steroid injection    She had right hip replaced March 2024.      5. Long term (current) use of non-steroidal anti-inflammatories (nsaid)  Assessment & Plan:  Diclofenac    Risks of NSAIDs discussed including GI upset, GI bleeding, renal and hepatic risks and the risks of cardiovascular disease. Warned not to take with other NSAIDs including OTC NSAIDs.      6. Gastroesophageal reflux disease, unspecified whether esophagitis present  Assessment & Plan:  Doing well. Reflux resolved after lap band removed.       7. Chronic pain of both shoulders  Assessment & Plan:  Left shoulder steroid injection 6/25/2024.  She had great relief  with this.    Improved          Follow Up:   Return in about 6 months (around 8/3/2025) for MALIK Swartz         CAITLYN Butler  [facility]

## 2025-02-04 LAB
ALBUMIN SERPL-MCNC: 4.2 G/DL (ref 3.5–5.2)
ALBUMIN/GLOB SERPL: 1.3 G/DL
ALP SERPL-CCNC: 64 U/L (ref 39–117)
ALT SERPL W P-5'-P-CCNC: 11 U/L (ref 1–33)
ANION GAP SERPL CALCULATED.3IONS-SCNC: 9.5 MMOL/L (ref 5–15)
AST SERPL-CCNC: 17 U/L (ref 1–32)
BASOPHILS # BLD MANUAL: 0.08 10*3/MM3 (ref 0–0.2)
BASOPHILS NFR BLD MANUAL: 1.1 % (ref 0–1.5)
BILIRUB SERPL-MCNC: <0.2 MG/DL (ref 0–1.2)
BUN SERPL-MCNC: 16 MG/DL (ref 6–20)
BUN/CREAT SERPL: 17 (ref 7–25)
CALCIUM SPEC-SCNC: 9 MG/DL (ref 8.6–10.5)
CHLORIDE SERPL-SCNC: 104 MMOL/L (ref 98–107)
CO2 SERPL-SCNC: 25.5 MMOL/L (ref 22–29)
CREAT SERPL-MCNC: 0.94 MG/DL (ref 0.57–1)
EGFRCR SERPLBLD CKD-EPI 2021: 72.3 ML/MIN/1.73
EOSINOPHIL # BLD MANUAL: 0 10*3/MM3 (ref 0–0.4)
EOSINOPHIL NFR BLD MANUAL: 0 % (ref 0.3–6.2)
GLOBULIN UR ELPH-MCNC: 3.3 GM/DL
GLUCOSE SERPL-MCNC: 106 MG/DL (ref 65–99)
LYMPHOCYTES # BLD MANUAL: 2.16 10*3/MM3 (ref 0.7–3.1)
LYMPHOCYTES NFR BLD MANUAL: 4.3 % (ref 5–12)
MONOCYTES # BLD: 0.32 10*3/MM3 (ref 0.1–0.9)
MYELOCYTES NFR BLD MANUAL: 1.1 % (ref 0–0)
NEUTROPHILS # BLD AUTO: 4.73 10*3/MM3 (ref 1.7–7)
NEUTROPHILS NFR BLD MANUAL: 64.1 % (ref 42.7–76)
PLAT MORPH BLD: NORMAL
POTASSIUM SERPL-SCNC: 4 MMOL/L (ref 3.5–5.2)
PROT SERPL-MCNC: 7.5 G/DL (ref 6–8.5)
RBC MORPH BLD: NORMAL
SODIUM SERPL-SCNC: 139 MMOL/L (ref 136–145)
VARIANT LYMPHS NFR BLD MANUAL: 29.3 % (ref 19.6–45.3)
WBC MORPH BLD: NORMAL

## 2025-03-19 ENCOUNTER — TELEPHONE (OUTPATIENT)
Age: 55
End: 2025-03-19
Payer: COMMERCIAL

## 2025-04-16 ENCOUNTER — OFFICE VISIT (OUTPATIENT)
Dept: CARDIOLOGY | Facility: CLINIC | Age: 55
End: 2025-04-16
Payer: COMMERCIAL

## 2025-04-16 VITALS
WEIGHT: 222.6 LBS | SYSTOLIC BLOOD PRESSURE: 118 MMHG | BODY MASS INDEX: 34.94 KG/M2 | HEIGHT: 67 IN | HEART RATE: 68 BPM | DIASTOLIC BLOOD PRESSURE: 80 MMHG

## 2025-04-16 DIAGNOSIS — E55.9 VITAMIN D DEFICIENCY: ICD-10-CM

## 2025-04-16 DIAGNOSIS — E88.810 METABOLIC SYNDROME: ICD-10-CM

## 2025-04-16 DIAGNOSIS — I47.10 PAROXYSMAL SVT (SUPRAVENTRICULAR TACHYCARDIA): Primary | ICD-10-CM

## 2025-04-16 DIAGNOSIS — I47.10 SVT (SUPRAVENTRICULAR TACHYCARDIA): ICD-10-CM

## 2025-04-16 DIAGNOSIS — E66.01 SEVERE OBESITY (BMI 35.0-39.9) WITH COMORBIDITY: ICD-10-CM

## 2025-04-16 DIAGNOSIS — M15.0 PRIMARY OSTEOARTHRITIS INVOLVING MULTIPLE JOINTS: ICD-10-CM

## 2025-04-16 DIAGNOSIS — E78.2 MIXED HYPERLIPIDEMIA: ICD-10-CM

## 2025-04-16 NOTE — PROGRESS NOTES
Chief Complaint   Patient presents with    Follow-up     Cardiac management    Lab     Last labs in chart.    Med Refill     PCP writes refills on medications.   Subjective   HPI  Tasha Muñoz is a 54 y.o. female seen in 2018 for palpitations. She was diagnosed with mitral valve prolapse many years ago.  Echo showed mild to moderate MR with no significant prolapse. At that time, Holter showed no significant arrhythmia. She had occ PVC during stress test. Treated with Lopressor.  She then presented for routine fu 2023 in SVT at 144 bpm. Converted to sinus with sotalol. She was referred to Dr. Stanley, underwent successful ablation on 2023. She had right hip replacement 3/2024. She has significant arthritis, difficulty managing weight.      She returns today for yearly follow up. She continues to have arthritis pain. Has lost >20 lb with semaglutide. Unfortunately, insurance has not covered well and will not be able to get through the compounding pharmacy. She denies palpitations post ablation. Remains off beta blockers.      Cardiac History:    Past Surgical History:   Procedure Laterality Date    ABLATION OF DYSRHYTHMIC FOCUS      CARDIAC ELECTROPHYSIOLOGY PROCEDURE N/A 2023    Procedure: Ablation SVT;  Surgeon: Juan Manuel Stanley DO;  Location: [facility] INVASIVE LOCATION;  Service: Cardiovascular;  Laterality: N/A;  Hold Sotalol x 3 days prior    CARDIOVASCULAR STRESS TEST  2018    5 Min. 7.0 METS. 86% THR. BP- 179/76. Rare PVC. Negative.     SECTION      CONVERTED (HISTORICAL) HOLTER  2018    AVG HR 71 BPM.     ECHO - CONVERTED  03/15/2017    EF 65%. Mild- Mod MR. RVSP- 35 mmHg.    ECHO - CONVERTED  2018    EF 65%. Mild MR. RVSP- 22 mmHg. Small pericardial Effusion    KNEE SURGERY      ARTHROSCOPIC RIGHT KNEE    LAPAROSCOPIC GASTRIC BANDING      REPLACEMENT TOTAL KNEE Right     REPLACEMENT TOTAL KNEE      THUMB ARTHROSCOPY Left     TOTAL HIP  ARTHROPLASTY Right 2024     Current Outpatient Medications   Medication Sig Dispense Refill    Ascorbic Acid (VITAMIN C PO) Take 500 mg by mouth Daily.      B Complex Vitamins (VITAMIN B COMPLEX PO) Take 1 tablet by mouth Daily.      diclofenac (VOLTAREN) 75 MG EC tablet Take 1 tablet by mouth 2 (Two) Times a Day As Needed (joint pain). (Patient taking differently: Take 1 tablet by mouth 2 (Two) Times a Day.) 180 tablet 1    estradiol-norethindrone (COMBIPATCH) 0.05-0.14 MG/DAY patch Place 1 patch on the skin as directed by provider 2 (Two) Times a Week.      nortriptyline (PAMELOR) 25 MG capsule Take 1 capsule by mouth Every Night.      ondansetron ODT (ZOFRAN-ODT) 4 MG disintegrating tablet DISSOLVE ONE TABLET BY MOUTH EVERY SIX HOURS AS NEEDED      Prasterone (Intrarosa) 6.5 MG insert Insert  into the vagina Every Night. AS DIRECTED      pravastatin (PRAVACHOL) 20 MG tablet Take 1 tablet by mouth Daily.      predniSONE (DELTASONE) 5 MG tablet Take 1 tablet by mouth Daily.      Semaglutide-Weight Management 0.5 MG/0.5ML solution auto-injector Inject  under the skin into the appropriate area as directed 1 (One) Time Per Week.      traZODone (DESYREL) 50 MG tablet Take 1 tablet by mouth Every Night.      venlafaxine (EFFEXOR) 75 MG tablet Take 1 tablet by mouth Daily.       No current facility-administered medications for this visit.     Patient has no known allergies.    Past Medical History:   Diagnosis Date    Abnormal ECG     Arthralgia     Arthritis     Back pain     Bilateral hand numbness     Elbow deformity     GERD (gastroesophageal reflux disease)     Hiatal hernia     Hx of  section     Hx of foot surgery 2018    Hx of laparoscopic gastric banding 2018    Dr. Ornelas    Hx of total knee replacement, right     Hyperlipidemia     Medial epicondylitis     Mitral valve prolapse     Mitral valve prolapse     NSAID long-term use     Osteoarthritis     FINGER AN RIGHT KNEE    PONV  "(postoperative nausea and vomiting)     TMJ arthritis     Trochanteric bursitis of both hips     Weight gain      Social History     Socioeconomic History    Marital status:    Tobacco Use    Smoking status: Never     Passive exposure: Past    Smokeless tobacco: Never   Vaping Use    Vaping status: Never Used   Substance and Sexual Activity    Alcohol use: No    Drug use: No    Sexual activity: Yes     Partners: Male     Birth control/protection: Patch, Post-menopausal     Family History   Problem Relation Age of Onset    Lung cancer Mother     Hypertension Mother     Heart failure Father     No Known Problems Brother     No Known Problems Son     No Known Problems Daughter      Review of Systems   Constitutional: Positive for weight loss. Negative for decreased appetite and malaise/fatigue.   HENT: Negative.     Eyes:  Negative for blurred vision.   Cardiovascular:  Negative for chest pain, dyspnea on exertion, leg swelling, palpitations and syncope.   Respiratory:  Negative for shortness of breath and sleep disturbances due to breathing.    Endocrine: Negative.    Hematologic/Lymphatic: Negative for bleeding problem. Does not bruise/bleed easily.   Skin: Negative.    Musculoskeletal:  Positive for arthritis, joint pain and joint swelling. Negative for falls and myalgias.   Gastrointestinal:  Negative for abdominal pain, heartburn and melena.   Genitourinary:  Negative for hematuria.   Neurological:  Negative for dizziness and light-headedness.   Psychiatric/Behavioral:  Negative for altered mental status.    Allergic/Immunologic: Negative.       Objective     /80 (BP Location: Left arm, Patient Position: Sitting)   Pulse 68   Ht 170.2 cm (67.01\")   Wt 101 kg (222 lb 9.6 oz)   BMI 34.86 kg/m²     Vitals and nursing note reviewed.   Constitutional:       General: Not in acute distress.     Appearance: Well-developed. Not diaphoretic.   Eyes:      Pupils: Pupils are equal, round, and reactive to " light.   HENT:      Head: Normocephalic.   Pulmonary:      Effort: Pulmonary effort is normal. No respiratory distress.      Breath sounds: Normal breath sounds.   Cardiovascular:      Normal rate. Regular rhythm.   Pulses:     Intact distal pulses.   Edema:     Peripheral edema absent.   Abdominal:      General: Bowel sounds are normal.      Palpations: Abdomen is soft.   Musculoskeletal: Normal range of motion.      Cervical back: Normal range of motion. Skin:     General: Skin is warm and dry.   Neurological:      Mental Status: Alert and oriented to person, place, and time.        Procedures          Problem List Items Addressed This Visit          Cardiac and Vasculature    Paroxysmal SVT (supraventricular tachycardia) - Primary    Overview   14-day Holter monitor, 11/7/2018: No A-fib, no SVT, no heart block, no pauses, no SVT.  All events correlated to sinus rhythm  Treadmill stress test, 11/6/2018: Rare PVCs noted during stress.  Negative stress test for stress-induced ischemia  SVT due to AV matt reentry 7/13/2023         Relevant Orders    Lipid Panel    Magnesium    TSH    Vitamin D,25-Hydroxy    Vitamin B12    Hemoglobin A1c    CBC (No Diff)    Mixed hyperlipidemia    Relevant Orders    Lipid Panel    Magnesium    TSH    Vitamin D,25-Hydroxy    Vitamin B12    Hemoglobin A1c    CBC (No Diff)       Endocrine and Metabolic    Metabolic syndrome    Relevant Orders    Lipid Panel    Magnesium    TSH    Vitamin D,25-Hydroxy    Vitamin B12    Hemoglobin A1c    CBC (No Diff)       Musculoskeletal and Injuries    Osteoarthritis    Overview   FINGER         Relevant Orders    Lipid Panel    Magnesium    TSH    Vitamin D,25-Hydroxy    Vitamin B12    Hemoglobin A1c    CBC (No Diff)     Other Visit Diagnoses         Severe obesity (BMI 35.0-39.9) with comorbidity        Relevant Orders    Lipid Panel    Magnesium    TSH    Vitamin D,25-Hydroxy    Vitamin B12    Hemoglobin A1c    CBC (No Diff)      Vitamin D  deficiency        Relevant Orders    Lipid Panel    Magnesium    TSH    Vitamin D,25-Hydroxy    Vitamin B12    Hemoglobin A1c    CBC (No Diff)      SVT (supraventricular tachycardia)        Relevant Orders    Lipid Panel    Magnesium    TSH    Vitamin D,25-Hydroxy    Vitamin B12    Hemoglobin A1c    CBC (No Diff)           PSVT  -Successful radiofrequency ablation per Dr. Stanley on 7/13/2023  -She remains off beta-blocker     Metabolic syndrome  -Wegovy prescribed to aid in weight loss efforts  -unable to get due to supply although insurance approved  -national shortage is over, will retry script for Wegovy      Hypercholesterolemia  -Managed with pravastatin 20 mg  -Lipids and LFT have been stable  -recheck labs      Arthritis  -s/p right total hip replacement   -significant joint pain with improvement after weight loss    Check labs including lipids, mag, TSH, vit B12, vit D, A1C. Further recommendations to follow.      FU 1 yr, sooner if needed.             Electronically signed by CAITLYN Maria,  April 20, 2025 17:37 EDT

## 2025-04-20 RX ORDER — SEMAGLUTIDE 0.25 MG/.5ML
0.5 INJECTION, SOLUTION SUBCUTANEOUS WEEKLY
Qty: 2 ML | Refills: 3 | Status: SHIPPED | OUTPATIENT
Start: 2025-04-20

## 2025-04-21 ENCOUNTER — LAB (OUTPATIENT)
Dept: LAB | Facility: HOSPITAL | Age: 55
End: 2025-04-21
Payer: COMMERCIAL

## 2025-04-21 DIAGNOSIS — I47.10 SVT (SUPRAVENTRICULAR TACHYCARDIA): ICD-10-CM

## 2025-04-21 DIAGNOSIS — E88.810 METABOLIC SYNDROME: ICD-10-CM

## 2025-04-21 DIAGNOSIS — E55.9 VITAMIN D DEFICIENCY: ICD-10-CM

## 2025-04-21 DIAGNOSIS — E78.2 MIXED HYPERLIPIDEMIA: ICD-10-CM

## 2025-04-21 DIAGNOSIS — E66.01 SEVERE OBESITY (BMI 35.0-39.9) WITH COMORBIDITY: ICD-10-CM

## 2025-04-21 DIAGNOSIS — M15.0 PRIMARY OSTEOARTHRITIS INVOLVING MULTIPLE JOINTS: ICD-10-CM

## 2025-04-21 DIAGNOSIS — I47.10 PAROXYSMAL SVT (SUPRAVENTRICULAR TACHYCARDIA): ICD-10-CM

## 2025-04-21 LAB
CHOLEST SERPL-MCNC: 134 MG/DL (ref 0–200)
DEPRECATED RDW RBC AUTO: 45.9 FL (ref 37–54)
ERYTHROCYTE [DISTWIDTH] IN BLOOD BY AUTOMATED COUNT: 14.3 % (ref 12.3–15.4)
HBA1C MFR BLD: 5.5 % (ref 4.8–5.6)
HCT VFR BLD AUTO: 40.4 % (ref 34–46.6)
HDLC SERPL-MCNC: 37 MG/DL (ref 40–60)
HGB BLD-MCNC: 12.4 G/DL (ref 12–15.9)
LDLC SERPL CALC-MCNC: 81 MG/DL (ref 0–100)
LDLC/HDLC SERPL: 2.17 {RATIO}
MAGNESIUM SERPL-MCNC: 2.2 MG/DL (ref 1.6–2.6)
MCH RBC QN AUTO: 27 PG (ref 26.6–33)
MCHC RBC AUTO-ENTMCNC: 30.7 G/DL (ref 31.5–35.7)
MCV RBC AUTO: 88 FL (ref 79–97)
PLATELET # BLD AUTO: 326 10*3/MM3 (ref 140–450)
PMV BLD AUTO: 10.3 FL (ref 6–12)
RBC # BLD AUTO: 4.59 10*6/MM3 (ref 3.77–5.28)
TRIGL SERPL-MCNC: 83 MG/DL (ref 0–150)
TSH SERPL DL<=0.05 MIU/L-ACNC: 2.1 UIU/ML (ref 0.27–4.2)
VLDLC SERPL-MCNC: 16 MG/DL (ref 5–40)
WBC NRBC COR # BLD AUTO: 5.14 10*3/MM3 (ref 3.4–10.8)

## 2025-04-21 PROCEDURE — 82306 VITAMIN D 25 HYDROXY: CPT

## 2025-04-21 PROCEDURE — 82607 VITAMIN B-12: CPT

## 2025-04-21 PROCEDURE — 83036 HEMOGLOBIN GLYCOSYLATED A1C: CPT

## 2025-04-21 PROCEDURE — 83735 ASSAY OF MAGNESIUM: CPT

## 2025-04-21 PROCEDURE — 36415 COLL VENOUS BLD VENIPUNCTURE: CPT

## 2025-04-21 PROCEDURE — 84443 ASSAY THYROID STIM HORMONE: CPT

## 2025-04-21 PROCEDURE — 85027 COMPLETE CBC AUTOMATED: CPT

## 2025-04-21 PROCEDURE — 80061 LIPID PANEL: CPT

## 2025-04-22 ENCOUNTER — CLINICAL SUPPORT (OUTPATIENT)
Age: 55
End: 2025-04-22
Payer: COMMERCIAL

## 2025-04-22 VITALS
DIASTOLIC BLOOD PRESSURE: 88 MMHG | WEIGHT: 217.3 LBS | TEMPERATURE: 97.2 F | BODY MASS INDEX: 34.11 KG/M2 | SYSTOLIC BLOOD PRESSURE: 124 MMHG | HEIGHT: 67 IN | HEART RATE: 73 BPM

## 2025-04-22 DIAGNOSIS — M70.61 TROCHANTERIC BURSITIS OF BOTH HIPS: Primary | ICD-10-CM

## 2025-04-22 DIAGNOSIS — M70.62 TROCHANTERIC BURSITIS OF BOTH HIPS: Primary | ICD-10-CM

## 2025-04-22 LAB
25(OH)D3 SERPL-MCNC: 75.3 NG/ML (ref 30–100)
VIT B12 BLD-MCNC: 701 PG/ML (ref 211–946)

## 2025-04-22 PROCEDURE — 20610 DRAIN/INJ JOINT/BURSA W/O US: CPT

## 2025-04-22 RX ORDER — METHYLPREDNISOLONE ACETATE 80 MG/ML
80 INJECTION, SUSPENSION INTRA-ARTICULAR; INTRALESIONAL; INTRAMUSCULAR; SOFT TISSUE
Status: COMPLETED | OUTPATIENT
Start: 2025-04-22 | End: 2025-04-22

## 2025-04-22 RX ORDER — LIDOCAINE HYDROCHLORIDE 10 MG/ML
1 INJECTION, SOLUTION EPIDURAL; INFILTRATION; INTRACAUDAL; PERINEURAL ONCE
Status: COMPLETED | OUTPATIENT
Start: 2025-04-22 | End: 2025-04-22

## 2025-04-22 RX ORDER — LIDOCAINE HYDROCHLORIDE 10 MG/ML
1 INJECTION, SOLUTION EPIDURAL; INFILTRATION; INTRACAUDAL; PERINEURAL ONCE
Status: CANCELLED | OUTPATIENT
Start: 2025-04-22

## 2025-04-22 RX ORDER — METHYLPREDNISOLONE ACETATE 80 MG/ML
80 INJECTION, SUSPENSION INTRA-ARTICULAR; INTRALESIONAL; INTRAMUSCULAR; SOFT TISSUE ONCE
Status: COMPLETED | OUTPATIENT
Start: 2025-04-22 | End: 2025-04-22

## 2025-04-22 RX ORDER — METHYLPREDNISOLONE ACETATE 80 MG/ML
80 INJECTION, SUSPENSION INTRA-ARTICULAR; INTRALESIONAL; INTRAMUSCULAR; SOFT TISSUE ONCE
Status: CANCELLED | OUTPATIENT
Start: 2025-04-22

## 2025-04-22 RX ADMIN — LIDOCAINE HYDROCHLORIDE 1 ML: 10 INJECTION, SOLUTION EPIDURAL; INFILTRATION; INTRACAUDAL; PERINEURAL at 15:50

## 2025-04-22 RX ADMIN — METHYLPREDNISOLONE ACETATE 80 MG: 80 INJECTION, SUSPENSION INTRA-ARTICULAR; INTRALESIONAL; INTRAMUSCULAR; SOFT TISSUE at 15:50

## 2025-04-22 RX ADMIN — METHYLPREDNISOLONE ACETATE 80 MG: 80 INJECTION, SUSPENSION INTRA-ARTICULAR; INTRALESIONAL; INTRAMUSCULAR; SOFT TISSUE at 15:40

## 2025-04-22 NOTE — PROGRESS NOTES
Office Visit       Date: 2025   Patient Name: Tasha Muñoz  MRN: [id number]  YOB: 1970    Referring Physician: No ref. provider found     Chief Complaint:   Chief Complaint   Patient presents with    Osteoarthritis     RIGHT HIP INJECTION       History of Present Illness: Tasha Muñoz is a 54 y.o. female who is here today for an injection of the right hip. No steroid injections within the last 3 months to right hip bursa.       Subjective   Review of Systems:  Review of Systems   Musculoskeletal:  Positive for arthralgias.        Past Medical History:   Past Medical History:   Diagnosis Date    Abnormal ECG     Arthralgia     Arthritis     Back pain     Bilateral hand numbness     Elbow deformity     GERD (gastroesophageal reflux disease)     Hiatal hernia     Hx of  section     Hx of foot surgery 2018    Hx of laparoscopic gastric banding 2018    Dr. Ornelas    Hx of total knee replacement, right     Hyperlipidemia     Medial epicondylitis     Mitral valve prolapse     Mitral valve prolapse     NSAID long-term use     Osteoarthritis     FINGER AN RIGHT KNEE    PONV (postoperative nausea and vomiting)     TMJ arthritis     Trochanteric bursitis of both hips     Weight gain        Past Surgical History:   Past Surgical History:   Procedure Laterality Date    ABLATION OF DYSRHYTHMIC FOCUS      CARDIAC ELECTROPHYSIOLOGY PROCEDURE N/A 2023    Procedure: Ablation SVT;  Surgeon: Juan Manuel Stanley DO;  Location: [facility] INVASIVE LOCATION;  Service: Cardiovascular;  Laterality: N/A;  Hold Sotalol x 3 days prior    CARDIOVASCULAR STRESS TEST  2018    5 Min. 7.0 METS. 86% THR. BP- 179/76. Rare PVC. Negative.     SECTION      CONVERTED (HISTORICAL) HOLTER  2018    AVG HR 71 BPM.     ECHO - CONVERTED  03/15/2017    EF 65%. Mild- Mod MR. RVSP- 35 mmHg.    ECHO - CONVERTED  2018    EF 65%. Mild MR. RVSP- 22 mmHg. Small  pericardial Effusion    KNEE SURGERY      ARTHROSCOPIC RIGHT KNEE    LAPAROSCOPIC GASTRIC BANDING      REPLACEMENT TOTAL KNEE Right     REPLACEMENT TOTAL KNEE      THUMB ARTHROSCOPY Left     TOTAL HIP ARTHROPLASTY Right 03/2024       Family History:   Family History   Problem Relation Age of Onset    Lung cancer Mother     Hypertension Mother     Heart failure Father     No Known Problems Brother     No Known Problems Son     No Known Problems Daughter        Social History:   Social History     Socioeconomic History    Marital status:    Tobacco Use    Smoking status: Never     Passive exposure: Past    Smokeless tobacco: Never   Vaping Use    Vaping status: Never Used   Substance and Sexual Activity    Alcohol use: No    Drug use: No    Sexual activity: Yes     Partners: Male     Birth control/protection: Patch, Post-menopausal       Medications:   Current Outpatient Medications:     Ascorbic Acid (VITAMIN C PO), Take 500 mg by mouth Daily., Disp: , Rfl:     B Complex Vitamins (VITAMIN B COMPLEX PO), Take 1 tablet by mouth Daily., Disp: , Rfl:     diclofenac (VOLTAREN) 75 MG EC tablet, Take 1 tablet by mouth 2 (Two) Times a Day As Needed (joint pain). (Patient taking differently: Take 1 tablet by mouth 2 (Two) Times a Day.), Disp: 180 tablet, Rfl: 1    estradiol-norethindrone (COMBIPATCH) 0.05-0.14 MG/DAY patch, Place 1 patch on the skin as directed by provider 2 (Two) Times a Week., Disp: , Rfl:     nortriptyline (PAMELOR) 25 MG capsule, Take 1 capsule by mouth Every Night., Disp: , Rfl:     ondansetron ODT (ZOFRAN-ODT) 4 MG disintegrating tablet, DISSOLVE ONE TABLET BY MOUTH EVERY SIX HOURS AS NEEDED, Disp: , Rfl:     Prasterone (Intrarosa) 6.5 MG insert, Insert  into the vagina Every Night. AS DIRECTED, Disp: , Rfl:     pravastatin (PRAVACHOL) 20 MG tablet, Take 1 tablet by mouth Daily., Disp: , Rfl:     predniSONE (DELTASONE) 5 MG tablet, Take 1 tablet by mouth As Needed (AS NEEDED)., Disp: , Rfl:      "Semaglutide-Weight Management (Wegovy) 0.25 MG/0.5ML solution auto-injector, Inject 1 mL under the skin into the appropriate area as directed 1 (One) Time Per Week., Disp: 2 mL, Rfl: 3    traZODone (DESYREL) 50 MG tablet, Take 1 tablet by mouth Every Night., Disp: , Rfl:     venlafaxine (EFFEXOR) 75 MG tablet, Take 1 tablet by mouth Daily., Disp: , Rfl:     Current Facility-Administered Medications:     lidocaine PF 1% (XYLOCAINE) injection 1 mL, 1 mL, Infiltration, Once, Becky Bell APRN    methylPREDNISolone acetate (DEPO-medrol) injection 80 mg, 80 mg, Intra-articular, Once, Becky Bell APRN    Allergies: No Known Allergies    I have reviewed and updated the patient's chief complaint, history of present illness, review of systems, past medical history, surgical history, family history, social history, medications and allergy list as appropriate.     Objective    Vital Signs:   Vitals:    04/22/25 1515   BP: 124/88   BP Location: Left arm   Patient Position: Sitting   Cuff Size: Adult   Pulse: 73   Temp: 97.2 °F (36.2 °C)   TempSrc: Skin   Weight: 98.6 kg (217 lb 4.8 oz)   Height: 170.2 cm (67.01\")   PainSc: 6    PainLoc: Hip  Comment: RIGHT HIP       Body mass index is 34.02 kg/m².           Physical Exam  Constitutional:       Appearance: Normal appearance.   HENT:      Head: Normocephalic.   Eyes:      Pupils: Pupils are equal, round, and reactive to light.   Cardiovascular:      Rate and Rhythm: Regular rhythm.      Pulses: Normal pulses.      Heart sounds: Normal heart sounds.   Pulmonary:      Effort: Pulmonary effort is normal.      Breath sounds: Normal breath sounds.   Abdominal:      General: Bowel sounds are normal.      Palpations: Abdomen is soft.   Musculoskeletal:      Cervical back: Normal range of motion and neck supple.      Right hip: Tenderness present. Decreased strength.   Skin:     General: Skin is warm.   Neurological:      General: No focal deficit present.      Mental Status: " She is alert and oriented to person, place, and time.   Psychiatric:         Behavior: Behavior normal.         Judgment: Judgment normal.            Arthrocentesis    Date/Time: 4/22/2025 3:40 PM    Performed by: Becky Bell APRN  Authorized by: Becky Bell APRN  Indications: pain   Body area: hip  Joint: right hip  Local anesthesia used: yes    Anesthesia:  Local anesthesia used: yes  Local Anesthetic: lidocaine 1% without epinephrine and topical anesthetic (Ethyl Chloride)    Sedation:  Patient sedated: no    Needle gauge: 25G.  Ultrasound guidance: no  Aspirate amount: 0 mL  Meds administered: 80 mg methylPREDNISolone acetate 80 MG/ML  Patient tolerance: patient tolerated the procedure well with no immediate complications          Assessment / Plan      Assessment & Plan  Trochanteric bursitis of both hips  R>L  Left 1/26/23 steroid injection    Right hip injection 4/22/2025    She had right hip replaced March 2024.  Pain appears to be related to bursa today   Reviewed risks/benefits. Consent form signed.       Orders:    Arthrocentesis    methylPREDNISolone acetate (DEPO-medrol) injection 80 mg    lidocaine PF 1% (XYLOCAINE) injection 1 mL            Follow Up:   No follow-ups on file.        CAITLYN Griffith  [facility]

## 2025-04-22 NOTE — ASSESSMENT & PLAN NOTE
R>L  Left 1/26/23 steroid injection    Right hip injection 4/22/2025    She had right hip replaced March 2024.  Pain appears to be related to bursa today   Reviewed risks/benefits. Consent form signed.       Orders:    Arthrocentesis    methylPREDNISolone acetate (DEPO-medrol) injection 80 mg    lidocaine PF 1% (XYLOCAINE) injection 1 mL

## 2025-07-24 RX ORDER — DICLOFENAC SODIUM 75 MG/1
75 TABLET, DELAYED RELEASE ORAL 2 TIMES DAILY PRN
Qty: 180 TABLET | Refills: 0 | Status: SHIPPED | OUTPATIENT
Start: 2025-07-24

## 2025-07-24 NOTE — TELEPHONE ENCOUNTER
Rx Refill Note  Requested Prescriptions     Pending Prescriptions Disp Refills    diclofenac (VOLTAREN) 75 MG EC tablet [Pharmacy Med Name: Diclofenac Sodium 75 MG Oral Tablet Delayed Release] 180 tablet 3     Sig: TAKE 1 TABLET BY MOUTH TWICE  DAILY AS NEEDED FOR JOINT PAIN      Last office visit with prescribing clinician: 2/3/2025   Last telemedicine visit with prescribing clinician: Visit date not found   Next office visit with prescribing clinician: 8/4/2025        2/3/2025                 Would you like a call back once the refill request has been completed: [] Yes [] No    If the office needs to give you a call back, can they leave a voicemail: [] Yes [] No    Rx sent, hub ok to relay    Jonny Perales MA  07/24/25, 16:09 EDT